# Patient Record
Sex: MALE | Race: BLACK OR AFRICAN AMERICAN | NOT HISPANIC OR LATINO
[De-identification: names, ages, dates, MRNs, and addresses within clinical notes are randomized per-mention and may not be internally consistent; named-entity substitution may affect disease eponyms.]

---

## 2018-03-16 PROBLEM — Z00.00 ENCOUNTER FOR PREVENTIVE HEALTH EXAMINATION: Status: ACTIVE | Noted: 2018-03-16

## 2018-03-19 ENCOUNTER — APPOINTMENT (OUTPATIENT)
Dept: CARDIOLOGY | Facility: CLINIC | Age: 63
End: 2018-03-19
Payer: COMMERCIAL

## 2018-03-19 DIAGNOSIS — Z13.6 ENCOUNTER FOR SCREENING FOR CARDIOVASCULAR DISORDERS: ICD-10-CM

## 2018-03-19 DIAGNOSIS — E11.9 TYPE 2 DIABETES MELLITUS W/OUT COMPLICATIONS: ICD-10-CM

## 2018-03-19 DIAGNOSIS — N18.9 CHRONIC KIDNEY DISEASE, UNSPECIFIED: ICD-10-CM

## 2018-03-19 DIAGNOSIS — Z83.3 FAMILY HISTORY OF DIABETES MELLITUS: ICD-10-CM

## 2018-03-19 DIAGNOSIS — E66.3 OVERWEIGHT: ICD-10-CM

## 2018-03-19 DIAGNOSIS — E78.00 PURE HYPERCHOLESTEROLEMIA, UNSPECIFIED: ICD-10-CM

## 2018-03-19 DIAGNOSIS — I10 ESSENTIAL (PRIMARY) HYPERTENSION: ICD-10-CM

## 2018-03-19 DIAGNOSIS — N52.9 MALE ERECTILE DYSFUNCTION, UNSPECIFIED: ICD-10-CM

## 2018-03-19 PROCEDURE — 99205 OFFICE O/P NEW HI 60 MIN: CPT

## 2018-03-20 VITALS
BODY MASS INDEX: 30.32 KG/M2 | HEART RATE: 86 BPM | DIASTOLIC BLOOD PRESSURE: 90 MMHG | WEIGHT: 182 LBS | HEIGHT: 65 IN | SYSTOLIC BLOOD PRESSURE: 150 MMHG

## 2018-03-21 PROBLEM — E78.00 ELEVATED CHOLESTEROL: Status: ACTIVE | Noted: 2018-03-21

## 2018-03-21 PROBLEM — Z83.3 FAMILY HISTORY OF DIABETES MELLITUS: Status: ACTIVE | Noted: 2018-03-21

## 2018-03-21 PROBLEM — N18.9 CHRONIC RENAL INSUFFICIENCY: Status: ACTIVE | Noted: 2018-03-21

## 2018-03-21 PROBLEM — E11.9 DIABETES: Status: ACTIVE | Noted: 2018-03-21

## 2018-03-21 PROBLEM — Z13.6 ENCOUNTER FOR SCREENING FOR CARDIOVASCULAR DISORDERS: Status: ACTIVE | Noted: 2018-03-21

## 2018-03-21 PROBLEM — I10 HYPERTENSION: Status: ACTIVE | Noted: 2018-03-21

## 2018-03-21 PROBLEM — E66.3 OVERWEIGHT (BMI 25.0-29.9): Status: ACTIVE | Noted: 2018-03-21

## 2018-03-21 RX ORDER — GLIMEPIRIDE 1 MG/1
1 TABLET ORAL
Refills: 0 | Status: ACTIVE | COMMUNITY

## 2018-03-21 RX ORDER — METFORMIN HYDROCHLORIDE 500 MG/1
500 TABLET, COATED ORAL
Refills: 0 | Status: ACTIVE | COMMUNITY

## 2018-03-21 RX ORDER — TADALAFIL 10 MG/1
10 TABLET, FILM COATED ORAL
Refills: 0 | Status: ACTIVE | COMMUNITY

## 2018-03-21 RX ORDER — LOSARTAN POTASSIUM AND HYDROCHLOROTHIAZIDE 25; 100 MG/1; MG/1
100-25 TABLET ORAL
Qty: 30 | Refills: 2 | Status: ACTIVE | COMMUNITY
Start: 1900-01-01 | End: 1900-01-01

## 2019-06-03 PROBLEM — N52.9 MALE ERECTILE DISORDER: Status: ACTIVE | Noted: 2018-03-21

## 2021-07-24 ENCOUNTER — TRANSCRIPTION ENCOUNTER (OUTPATIENT)
Age: 66
End: 2021-07-24

## 2022-01-18 ENCOUNTER — INPATIENT (INPATIENT)
Facility: HOSPITAL | Age: 67
LOS: 2 days | Discharge: HOME | End: 2022-01-21
Attending: INTERNAL MEDICINE | Admitting: INTERNAL MEDICINE
Payer: COMMERCIAL

## 2022-01-18 VITALS
TEMPERATURE: 98 F | RESPIRATION RATE: 19 BRPM | OXYGEN SATURATION: 97 % | HEIGHT: 65 IN | SYSTOLIC BLOOD PRESSURE: 154 MMHG | WEIGHT: 130.07 LBS | HEART RATE: 109 BPM | DIASTOLIC BLOOD PRESSURE: 96 MMHG

## 2022-01-18 LAB
ALBUMIN SERPL ELPH-MCNC: 2.8 G/DL — LOW (ref 3.3–5)
ALP SERPL-CCNC: 72 U/L — SIGNIFICANT CHANGE UP (ref 40–120)
ALT FLD-CCNC: 22 U/L — SIGNIFICANT CHANGE UP (ref 12–78)
ANION GAP SERPL CALC-SCNC: 5 MMOL/L — SIGNIFICANT CHANGE UP (ref 5–17)
AST SERPL-CCNC: 24 U/L — SIGNIFICANT CHANGE UP (ref 15–37)
BASOPHILS # BLD AUTO: 0.04 K/UL — SIGNIFICANT CHANGE UP (ref 0–0.2)
BASOPHILS NFR BLD AUTO: 0.6 % — SIGNIFICANT CHANGE UP (ref 0–2)
BILIRUB SERPL-MCNC: 0.4 MG/DL — SIGNIFICANT CHANGE UP (ref 0.2–1.2)
BUN SERPL-MCNC: 37 MG/DL — HIGH (ref 7–23)
CALCIUM SERPL-MCNC: 14.4 MG/DL — CRITICAL HIGH (ref 8.5–10.1)
CHLORIDE SERPL-SCNC: 100 MMOL/L — SIGNIFICANT CHANGE UP (ref 96–108)
CO2 SERPL-SCNC: 31 MMOL/L — SIGNIFICANT CHANGE UP (ref 22–31)
CREAT SERPL-MCNC: 3.25 MG/DL — HIGH (ref 0.5–1.3)
EOSINOPHIL # BLD AUTO: 0.17 K/UL — SIGNIFICANT CHANGE UP (ref 0–0.5)
EOSINOPHIL NFR BLD AUTO: 2.6 % — SIGNIFICANT CHANGE UP (ref 0–6)
GLUCOSE SERPL-MCNC: 76 MG/DL — SIGNIFICANT CHANGE UP (ref 70–99)
HCT VFR BLD CALC: 38.9 % — LOW (ref 39–50)
HGB BLD-MCNC: 12.5 G/DL — LOW (ref 13–17)
IMM GRANULOCYTES NFR BLD AUTO: 0.2 % — SIGNIFICANT CHANGE UP (ref 0–1.5)
LYMPHOCYTES # BLD AUTO: 1.72 K/UL — SIGNIFICANT CHANGE UP (ref 1–3.3)
LYMPHOCYTES # BLD AUTO: 26.1 % — SIGNIFICANT CHANGE UP (ref 13–44)
MAGNESIUM SERPL-MCNC: 2.5 MG/DL — SIGNIFICANT CHANGE UP (ref 1.6–2.6)
MCHC RBC-ENTMCNC: 25.9 PG — LOW (ref 27–34)
MCHC RBC-ENTMCNC: 32.1 GM/DL — SIGNIFICANT CHANGE UP (ref 32–36)
MCV RBC AUTO: 80.5 FL — SIGNIFICANT CHANGE UP (ref 80–100)
MONOCYTES # BLD AUTO: 1.04 K/UL — HIGH (ref 0–0.9)
MONOCYTES NFR BLD AUTO: 15.8 % — HIGH (ref 2–14)
NEUTROPHILS # BLD AUTO: 3.61 K/UL — SIGNIFICANT CHANGE UP (ref 1.8–7.4)
NEUTROPHILS NFR BLD AUTO: 54.7 % — SIGNIFICANT CHANGE UP (ref 43–77)
NRBC # BLD: 0 /100 WBCS — SIGNIFICANT CHANGE UP (ref 0–0)
PLATELET # BLD AUTO: 331 K/UL — SIGNIFICANT CHANGE UP (ref 150–400)
POTASSIUM SERPL-MCNC: 4.7 MMOL/L — SIGNIFICANT CHANGE UP (ref 3.5–5.3)
POTASSIUM SERPL-SCNC: 4.7 MMOL/L — SIGNIFICANT CHANGE UP (ref 3.5–5.3)
PROT SERPL-MCNC: 8.3 GM/DL — SIGNIFICANT CHANGE UP (ref 6–8.3)
RBC # BLD: 4.83 M/UL — SIGNIFICANT CHANGE UP (ref 4.2–5.8)
RBC # FLD: 13.8 % — SIGNIFICANT CHANGE UP (ref 10.3–14.5)
SODIUM SERPL-SCNC: 136 MMOL/L — SIGNIFICANT CHANGE UP (ref 135–145)
WBC # BLD: 6.59 K/UL — SIGNIFICANT CHANGE UP (ref 3.8–10.5)
WBC # FLD AUTO: 6.59 K/UL — SIGNIFICANT CHANGE UP (ref 3.8–10.5)

## 2022-01-18 PROCEDURE — 99222 1ST HOSP IP/OBS MODERATE 55: CPT

## 2022-01-18 PROCEDURE — 71045 X-RAY EXAM CHEST 1 VIEW: CPT | Mod: 26

## 2022-01-18 PROCEDURE — 93010 ELECTROCARDIOGRAM REPORT: CPT

## 2022-01-18 PROCEDURE — 99285 EMERGENCY DEPT VISIT HI MDM: CPT

## 2022-01-18 RX ORDER — SODIUM CHLORIDE 9 MG/ML
2000 INJECTION INTRAMUSCULAR; INTRAVENOUS; SUBCUTANEOUS ONCE
Refills: 0 | Status: COMPLETED | OUTPATIENT
Start: 2022-01-18 | End: 2022-01-18

## 2022-01-18 RX ADMIN — SODIUM CHLORIDE 2000 MILLILITER(S): 9 INJECTION INTRAMUSCULAR; INTRAVENOUS; SUBCUTANEOUS at 21:35

## 2022-01-18 NOTE — ED ADULT TRIAGE NOTE - CHIEF COMPLAINT QUOTE
[id number], Bilat alternating flank pain past X1 week  Went to pulmonologist/nephrologist sent for abnormal labs  Hypercalcemia

## 2022-01-18 NOTE — ED ADULT NURSE NOTE - OBJECTIVE STATEMENT
[id number], Bilat alternating flank pain past X1 week /lower back. Went to pulmonologist/nephrologist sent for abnormal labs  Hypercalcemia. HX of diabetes HLD. pain on urination no hematuria. as per pt ongoing weight loss x 6 months and chronic cough under care of pulmonologist

## 2022-01-18 NOTE — ED ADULT NURSE NOTE - CHIEF COMPLAINT QUOTE
[id number], Bilat alternating flank pain past X1 week  Went to pulmonologist/nephrologist sent for abnormal labs  Hypercalcemia

## 2022-01-18 NOTE — ED PROVIDER NOTE - OBJECTIVE STATEMENT
This patient is a 66 year old man hx of DM sent to the ER by his pulmonologist Dr. Cantrell for hypercalcemia.  Patient reports that he began seeing a pulmonologist in July 2020 for a chronic cough that he has been experiencing for over a year.  That pulmonologist ordered Xrays no further testing and no follow-up.  He reports unintentional weight loss.  No fever, sick contacts, recent travel, SOB, or chest pain.  He went to Dr. Cantrell for the first time yesterday and was told that the Xray showed "hazziness."  He had blood work drawn today and was told to he had elevated calcium and needed to be admitted.    Dr. Cantrell called the ER stating that patient's Xray findings were concerning for sarcoidosis with bilateral mediastinal adenopathy.

## 2022-01-19 DIAGNOSIS — D86.9 SARCOIDOSIS, UNSPECIFIED: ICD-10-CM

## 2022-01-19 DIAGNOSIS — Z29.9 ENCOUNTER FOR PROPHYLACTIC MEASURES, UNSPECIFIED: ICD-10-CM

## 2022-01-19 DIAGNOSIS — E83.52 HYPERCALCEMIA: ICD-10-CM

## 2022-01-19 LAB
A1C WITH ESTIMATED AVERAGE GLUCOSE RESULT: 6.1 % — HIGH (ref 4–5.6)
ALBUMIN SERPL ELPH-MCNC: 2.9 G/DL — LOW (ref 3.3–5)
ALP SERPL-CCNC: 69 U/L — SIGNIFICANT CHANGE UP (ref 40–120)
ALT FLD-CCNC: 20 U/L — SIGNIFICANT CHANGE UP (ref 12–78)
ANION GAP SERPL CALC-SCNC: 2 MMOL/L — LOW (ref 5–17)
ANION GAP SERPL CALC-SCNC: 2 MMOL/L — LOW (ref 5–17)
APPEARANCE UR: CLEAR — SIGNIFICANT CHANGE UP
AST SERPL-CCNC: 21 U/L — SIGNIFICANT CHANGE UP (ref 15–37)
BACTERIA # UR AUTO: ABNORMAL
BILIRUB SERPL-MCNC: 0.3 MG/DL — SIGNIFICANT CHANGE UP (ref 0.2–1.2)
BILIRUB UR-MCNC: NEGATIVE — SIGNIFICANT CHANGE UP
BUN SERPL-MCNC: 35 MG/DL — HIGH (ref 7–23)
BUN SERPL-MCNC: 35 MG/DL — HIGH (ref 7–23)
CA-I BLD-SCNC: 2.05 MMOL/L — CRITICAL HIGH (ref 1.15–1.33)
CALCIUM SERPL-MCNC: 14.8 MG/DL — CRITICAL HIGH (ref 8.5–10.1)
CALCIUM SERPL-MCNC: 14.9 MG/DL — CRITICAL HIGH (ref 8.5–10.1)
CHLORIDE SERPL-SCNC: 103 MMOL/L — SIGNIFICANT CHANGE UP (ref 96–108)
CHLORIDE SERPL-SCNC: 108 MMOL/L — SIGNIFICANT CHANGE UP (ref 96–108)
CO2 SERPL-SCNC: 31 MMOL/L — SIGNIFICANT CHANGE UP (ref 22–31)
CO2 SERPL-SCNC: 33 MMOL/L — HIGH (ref 22–31)
COD CRY URNS QL: ABNORMAL
COLOR SPEC: YELLOW — SIGNIFICANT CHANGE UP
CREAT SERPL-MCNC: 2.88 MG/DL — HIGH (ref 0.5–1.3)
CREAT SERPL-MCNC: 2.9 MG/DL — HIGH (ref 0.5–1.3)
DIFF PNL FLD: ABNORMAL
EPI CELLS # UR: SIGNIFICANT CHANGE UP
ESTIMATED AVERAGE GLUCOSE: 128 MG/DL — HIGH (ref 68–114)
FLUAV AG NPH QL: SIGNIFICANT CHANGE UP
FLUBV AG NPH QL: SIGNIFICANT CHANGE UP
GLUCOSE BLDC GLUCOMTR-MCNC: 112 MG/DL — HIGH (ref 70–99)
GLUCOSE BLDC GLUCOMTR-MCNC: 154 MG/DL — HIGH (ref 70–99)
GLUCOSE BLDC GLUCOMTR-MCNC: 44 MG/DL — CRITICAL LOW (ref 70–99)
GLUCOSE BLDC GLUCOMTR-MCNC: 47 MG/DL — CRITICAL LOW (ref 70–99)
GLUCOSE BLDC GLUCOMTR-MCNC: 60 MG/DL — LOW (ref 70–99)
GLUCOSE BLDC GLUCOMTR-MCNC: 74 MG/DL — SIGNIFICANT CHANGE UP (ref 70–99)
GLUCOSE BLDC GLUCOMTR-MCNC: 95 MG/DL — SIGNIFICANT CHANGE UP (ref 70–99)
GLUCOSE BLDC GLUCOMTR-MCNC: 97 MG/DL — SIGNIFICANT CHANGE UP (ref 70–99)
GLUCOSE SERPL-MCNC: 119 MG/DL — HIGH (ref 70–99)
GLUCOSE SERPL-MCNC: 68 MG/DL — LOW (ref 70–99)
GLUCOSE UR QL: NEGATIVE MG/DL — SIGNIFICANT CHANGE UP
HCV AB S/CO SERPL IA: 0.24 S/CO — SIGNIFICANT CHANGE UP (ref 0–0.99)
HCV AB SERPL-IMP: SIGNIFICANT CHANGE UP
KETONES UR-MCNC: NEGATIVE — SIGNIFICANT CHANGE UP
LEUKOCYTE ESTERASE UR-ACNC: ABNORMAL
NITRITE UR-MCNC: NEGATIVE — SIGNIFICANT CHANGE UP
PH UR: 6.5 — SIGNIFICANT CHANGE UP (ref 5–8)
POTASSIUM SERPL-MCNC: 3.7 MMOL/L — SIGNIFICANT CHANGE UP (ref 3.5–5.3)
POTASSIUM SERPL-MCNC: 4.1 MMOL/L — SIGNIFICANT CHANGE UP (ref 3.5–5.3)
POTASSIUM SERPL-SCNC: 3.7 MMOL/L — SIGNIFICANT CHANGE UP (ref 3.5–5.3)
POTASSIUM SERPL-SCNC: 4.1 MMOL/L — SIGNIFICANT CHANGE UP (ref 3.5–5.3)
PROT SERPL-MCNC: 7.8 GM/DL — SIGNIFICANT CHANGE UP (ref 6–8.3)
PROT UR-MCNC: NEGATIVE MG/DL — SIGNIFICANT CHANGE UP
RBC CASTS # UR COMP ASSIST: SIGNIFICANT CHANGE UP /HPF (ref 0–4)
SARS-COV-2 RNA SPEC QL NAA+PROBE: SIGNIFICANT CHANGE UP
SODIUM SERPL-SCNC: 138 MMOL/L — SIGNIFICANT CHANGE UP (ref 135–145)
SODIUM SERPL-SCNC: 141 MMOL/L — SIGNIFICANT CHANGE UP (ref 135–145)
SP GR SPEC: 1.01 — SIGNIFICANT CHANGE UP (ref 1.01–1.02)
UROBILINOGEN FLD QL: NEGATIVE MG/DL — SIGNIFICANT CHANGE UP
WBC UR QL: ABNORMAL

## 2022-01-19 PROCEDURE — 99232 SBSQ HOSP IP/OBS MODERATE 35: CPT

## 2022-01-19 PROCEDURE — 74018 RADEX ABDOMEN 1 VIEW: CPT | Mod: 26

## 2022-01-19 PROCEDURE — 99255 IP/OBS CONSLTJ NEW/EST HI 80: CPT

## 2022-01-19 PROCEDURE — 99223 1ST HOSP IP/OBS HIGH 75: CPT

## 2022-01-19 RX ORDER — DEXTROSE 50 % IN WATER 50 %
25 SYRINGE (ML) INTRAVENOUS ONCE
Refills: 0 | Status: COMPLETED | OUTPATIENT
Start: 2022-01-19 | End: 2022-01-19

## 2022-01-19 RX ORDER — DEXTROSE 50 % IN WATER 50 %
25 SYRINGE (ML) INTRAVENOUS ONCE
Refills: 0 | Status: DISCONTINUED | OUTPATIENT
Start: 2022-01-19 | End: 2022-01-21

## 2022-01-19 RX ORDER — DEXTROSE 50 % IN WATER 50 %
12.5 SYRINGE (ML) INTRAVENOUS ONCE
Refills: 0 | Status: DISCONTINUED | OUTPATIENT
Start: 2022-01-19 | End: 2022-01-21

## 2022-01-19 RX ORDER — ZOLEDRONIC ACID 5 MG/100ML
4 INJECTION, SOLUTION INTRAVENOUS ONCE
Refills: 0 | Status: DISCONTINUED | OUTPATIENT
Start: 2022-01-19 | End: 2022-01-19

## 2022-01-19 RX ORDER — FUROSEMIDE 40 MG
40 TABLET ORAL EVERY 12 HOURS
Refills: 0 | Status: COMPLETED | OUTPATIENT
Start: 2022-01-19 | End: 2022-01-20

## 2022-01-19 RX ORDER — SODIUM CHLORIDE 9 MG/ML
1000 INJECTION, SOLUTION INTRAVENOUS
Refills: 0 | Status: COMPLETED | OUTPATIENT
Start: 2022-01-19 | End: 2022-01-19

## 2022-01-19 RX ORDER — AMLODIPINE BESYLATE 2.5 MG/1
10 TABLET ORAL DAILY
Refills: 0 | Status: DISCONTINUED | OUTPATIENT
Start: 2022-01-19 | End: 2022-01-21

## 2022-01-19 RX ORDER — DEXTROSE 50 % IN WATER 50 %
15 SYRINGE (ML) INTRAVENOUS ONCE
Refills: 0 | Status: COMPLETED | OUTPATIENT
Start: 2022-01-19 | End: 2022-01-19

## 2022-01-19 RX ORDER — SENNA PLUS 8.6 MG/1
2 TABLET ORAL AT BEDTIME
Refills: 0 | Status: DISCONTINUED | OUTPATIENT
Start: 2022-01-19 | End: 2022-01-21

## 2022-01-19 RX ORDER — SODIUM CHLORIDE 9 MG/ML
1000 INJECTION, SOLUTION INTRAVENOUS ONCE
Refills: 0 | Status: COMPLETED | OUTPATIENT
Start: 2022-01-19 | End: 2022-01-19

## 2022-01-19 RX ORDER — SODIUM CHLORIDE 9 MG/ML
1000 INJECTION INTRAMUSCULAR; INTRAVENOUS; SUBCUTANEOUS
Refills: 0 | Status: DISCONTINUED | OUTPATIENT
Start: 2022-01-19 | End: 2022-01-19

## 2022-01-19 RX ORDER — PAMIDRONATE DISODIUM 9 MG/ML
90 INJECTION, SOLUTION INTRAVENOUS ONCE
Refills: 0 | Status: COMPLETED | OUTPATIENT
Start: 2022-01-19 | End: 2022-01-19

## 2022-01-19 RX ORDER — SODIUM CHLORIDE 9 MG/ML
1000 INJECTION, SOLUTION INTRAVENOUS
Refills: 0 | Status: DISCONTINUED | OUTPATIENT
Start: 2022-01-19 | End: 2022-01-21

## 2022-01-19 RX ORDER — INSULIN LISPRO 100/ML
VIAL (ML) SUBCUTANEOUS
Refills: 0 | Status: DISCONTINUED | OUTPATIENT
Start: 2022-01-19 | End: 2022-01-21

## 2022-01-19 RX ORDER — GLUCAGON INJECTION, SOLUTION 0.5 MG/.1ML
1 INJECTION, SOLUTION SUBCUTANEOUS ONCE
Refills: 0 | Status: DISCONTINUED | OUTPATIENT
Start: 2022-01-19 | End: 2022-01-21

## 2022-01-19 RX ORDER — HYDRALAZINE HCL 50 MG
10 TABLET ORAL ONCE
Refills: 0 | Status: COMPLETED | OUTPATIENT
Start: 2022-01-19 | End: 2022-01-19

## 2022-01-19 RX ORDER — CALCITONIN SALMON 200 [IU]/ML
260 INJECTION, SOLUTION INTRAMUSCULAR ONCE
Refills: 0 | Status: DISCONTINUED | OUTPATIENT
Start: 2022-01-19 | End: 2022-01-19

## 2022-01-19 RX ORDER — POLYETHYLENE GLYCOL 3350 17 G/17G
17 POWDER, FOR SOLUTION ORAL DAILY
Refills: 0 | Status: DISCONTINUED | OUTPATIENT
Start: 2022-01-19 | End: 2022-01-21

## 2022-01-19 RX ORDER — SODIUM CHLORIDE 9 MG/ML
1000 INJECTION INTRAMUSCULAR; INTRAVENOUS; SUBCUTANEOUS
Refills: 0 | Status: DISCONTINUED | OUTPATIENT
Start: 2022-01-19 | End: 2022-01-21

## 2022-01-19 RX ORDER — DEXTROSE 50 % IN WATER 50 %
15 SYRINGE (ML) INTRAVENOUS ONCE
Refills: 0 | Status: DISCONTINUED | OUTPATIENT
Start: 2022-01-19 | End: 2022-01-21

## 2022-01-19 RX ORDER — CALCITONIN SALMON 200 [IU]/ML
260 INJECTION, SOLUTION INTRAMUSCULAR ONCE
Refills: 0 | Status: COMPLETED | OUTPATIENT
Start: 2022-01-19 | End: 2022-01-19

## 2022-01-19 RX ADMIN — Medication 15 GRAM(S): at 16:51

## 2022-01-19 RX ADMIN — AMLODIPINE BESYLATE 10 MILLIGRAM(S): 2.5 TABLET ORAL at 14:43

## 2022-01-19 RX ADMIN — Medication 40 MILLIGRAM(S): at 14:43

## 2022-01-19 RX ADMIN — SODIUM CHLORIDE 150 MILLILITER(S): 9 INJECTION, SOLUTION INTRAVENOUS at 02:47

## 2022-01-19 RX ADMIN — Medication 10 MILLIGRAM(S): at 05:40

## 2022-01-19 RX ADMIN — SODIUM CHLORIDE 150 MILLILITER(S): 9 INJECTION INTRAMUSCULAR; INTRAVENOUS; SUBCUTANEOUS at 14:46

## 2022-01-19 RX ADMIN — CALCITONIN SALMON 260 INTERNATIONAL UNIT(S): 200 INJECTION, SOLUTION INTRAMUSCULAR at 16:51

## 2022-01-19 RX ADMIN — SODIUM CHLORIDE 1000 MILLILITER(S): 9 INJECTION, SOLUTION INTRAVENOUS at 01:46

## 2022-01-19 RX ADMIN — PAMIDRONATE DISODIUM 70 MILLIGRAM(S): 9 INJECTION, SOLUTION INTRAVENOUS at 23:02

## 2022-01-19 RX ADMIN — Medication 25 GRAM(S): at 18:58

## 2022-01-19 RX ADMIN — Medication 40 MILLIGRAM(S): at 21:08

## 2022-01-19 RX ADMIN — SODIUM CHLORIDE 125 MILLILITER(S): 9 INJECTION INTRAMUSCULAR; INTRAVENOUS; SUBCUTANEOUS at 12:38

## 2022-01-19 RX ADMIN — SENNA PLUS 2 TABLET(S): 8.6 TABLET ORAL at 22:00

## 2022-01-19 RX ADMIN — POLYETHYLENE GLYCOL 3350 17 GRAM(S): 17 POWDER, FOR SOLUTION ORAL at 14:43

## 2022-01-19 NOTE — PROGRESS NOTE ADULT - PROBLEM SELECTOR PLAN 1
Outpatient CT shows (patient has copy of full report on phone)  Mediastinal and bilateral hilar adenopathy  Diffuse tiny nodules wihting a perilymphmatic distribution.  diffuse lower lobe ground glass opacity.      Consider sarcoidosis, especially in the setting of hypercalcemia.     Dr Hallman consulted  will reach out to Dr. Caraballo

## 2022-01-19 NOTE — H&P ADULT - PROBLEM SELECTOR PLAN 1
Outpatient CT shows (patient has copy of full report on phone)  Mediastinal and bilateral hilar adenopathy  Diffuse tiny nodules wihting a perilymphmatic distribution.  diffuse lower lobe ground glass opacity.      Consider sarcoidosis, especially in the setting of hypercalcemia.  Will consult Dr Hallman

## 2022-01-19 NOTE — CONSULT NOTE ADULT - ASSESSMENT
66 year old male with progressively worsening cough/dyspnea x 1 year, found to have hilar/mediastinal lymphadenopathy on recent CT, and now admitted for hypercalcemia.  Presentation most suggestive of sarcoidosis.      - Will start prednisone 40mg daily.    - Pulm planning EBUS / lung bx.    - f/u vit D levels.
67 y/o M w/HTN, DM, and chronic cough sent in for hypercalcemia. Patient has been undergoing workup for chronic cough and found to have bilateral opacities and hilar adenopathy on CT scan concerning for sarcoidosis now sent in for hypercalcemia likely secondary to sarcoidosis. Severe hypercalcemia on labs, however relatively asymtpomatic. MAURI.     - Treatment of hypercalcemia with IV fluids, diuretics, calcitonin, and bisphosphonate once renal function improves.  - Steroids for likely sarcoidosis  - Agree with EBUS for lymph node biopsies once calcium levels improved  - Trend Cr, avoid nephrotoxins  - Telemetry monitoring  - No current need for ICU level of care. Please recall with questions/concerns

## 2022-01-19 NOTE — CHART NOTE - NSCHARTNOTEFT_GEN_A_CORE
EVENT: Received telephone call from RN that pt is refusing to be transferred to telemetry unit.    HPI: 67 y/o M w/HTN, DM, and chronic cough sent in for hypercalcemia. Patient has been undergoing workup for chronic cough and found to have bilateral opacities and hilar adenopathy on CT scan concerning for sarcoidosis now sent in for hypercalcemia likely secondary to sarcoidosis. Severe hypercalcemia on labs, however relatively asymptomatic. MAURI.   Recommendations as per Md Barreto  - Treatment of hypercalcemia with IV fluids, diuretics, calcitonin, and bisphosphonate once renal function improves.  - Steroids for likely sarcoidosis  - Agree with EBUS for lymph node biopsies once calcium levels improved  - Trend Cr, avoid nephrotoxins  - Telemetry monitoring    Pt is refusing to be transferred to tele unit. Spoke to pt & his wife at length but both of them refused to be transferred due to the coziness of that room.

## 2022-01-19 NOTE — H&P ADULT - NSHPREVIEWOFSYSTEMS_GEN_ALL_CORE
Constitutional: no fever, chills, night sweats  Ears: no hearing changes or ear pain,   Nose: no nasal congestion, sinus pain, or rhinorrhea  Cardio: no chest pain, orthopnea, edema, or palpitations  Resp: no dyspnea, cough, wheezing  GI: no nausea, vomiting, diarrhea, constipation, hematochezia, or melena  : dysuria positive.  No urinary frequency, hematuria  MSK: no back pain, neck pain  Skin: no rash, pruritis   Neuro: no weakness, dizziness, lightheadedness, syncope   Heme/Lymph: no bruising or bleeding

## 2022-01-19 NOTE — PROVIDER CONTACT NOTE (OTHER) - SITUATION
pt's fs 47 , protocol followed NP made aware. calcium ionized 2.05, and glucose level, 74 bisi made aware

## 2022-01-19 NOTE — CONSULT NOTE ADULT - SUBJECTIVE AND OBJECTIVE BOX
[facility] NEPHROLOGY [facility], [facility]  NEPHROLOGY AND HYPERTENSION  [address]  [address]  [place] [address]  [phone]    MD TRACEY REARDON MD ANDREY GONCHARUK, MD MADHU KORRAPATI, MD YELENA ROSENBERG, MD BINNY KOSHY, MD CHRISTOPHER CAPUTO, MD EDWARD BOVER, MD      Information from chart:  "Patient is a 66y old  Male who presents with a chief complaint of sarcoidosis, hypercalcemia (2022 20:34)    HPI:  Patient is a 66M with a PMH of DM who presents to the ED for abnormal labs.  Patient has been following with Pulmonologist Michael Cantrell for history of cough x 1 year and worsening dyspnea.  Recently had a CT performed that showed hilar and mediastinal adenopathy and ground glass opacities.  Concern for Sarcoidosis, patient recently did labs and referred to the ED for hypercalcemia.  Patient states that he occasionally has flank pain on either side, along with dysuria.  Patient has no other complaints.  Nonsmoker.  Vitals stable, labs show significant hypercalcemia.  Will admit to med surg.   (2022 00:31)   "      No distress    PAST MEDICAL & SURGICAL HISTORY:  Diabetes    HLD (hyperlipidemia)      FAMILY HISTORY:  FH: HTN (hypertension)      Allergies    No Known Allergies    Intolerances      Home Medications:    MEDICATIONS  (STANDING):  amLODIPine   Tablet 10 milliGRAM(s) Oral daily  dextrose 40% Gel 15 Gram(s) Oral once  dextrose 5%. 1000 milliLiter(s) (50 mL/Hr) IV Continuous <Continuous>  dextrose 5%. 1000 milliLiter(s) (100 mL/Hr) IV Continuous <Continuous>  dextrose 50% Injectable 25 Gram(s) IV Push once  dextrose 50% Injectable 12.5 Gram(s) IV Push once  dextrose 50% Injectable 25 Gram(s) IV Push once  furosemide   Injectable 40 milliGRAM(s) IV Push every 12 hours  glucagon  Injectable 1 milliGRAM(s) IntraMuscular once  insulin lispro (ADMELOG) corrective regimen sliding scale   SubCutaneous three times a day before meals  pamidronate IVPB 90 milliGRAM(s) IV Intermittent once  polyethylene glycol 3350 17 Gram(s) Oral daily  predniSONE   Tablet 40 milliGRAM(s) Oral daily  senna 2 Tablet(s) Oral at bedtime  sodium chloride 0.9%. 1000 milliLiter(s) (150 mL/Hr) IV Continuous <Continuous>    MEDICATIONS  (PRN):    Vital Signs Last 24 Hrs  T(C): 36.7 (2022 11:30), Max: 37.2 (2022 02:06)  T(F): 98.1 (2022 11:30), Max: 98.9 (2022 02:06)  HR: 93 (2022 11:30) (93 - 102)  BP: 161/91 (2022 11:30) (160/98 - 182/95)  BP(mean): --  RR: 18 (2022 11:30) (17 - 20)  SpO2: 98% (2022 11:30) (95% - 98%)    Daily Height in cm: 165.1 (2022 02:06)    Daily     CAPILLARY BLOOD GLUCOSE      POCT Blood Glucose.: 154 mg/dL (2022 19:38)  POCT Blood Glucose.: 60 mg/dL (2022 18:50)  POCT Blood Glucose.: 74 mg/dL (2022 17:32)  POCT Blood Glucose.: 44 mg/dL (2022 16:38)  POCT Blood Glucose.: 47 mg/dL (2022 16:35)  POCT Blood Glucose.: 95 mg/dL (2022 12:37)  POCT Blood Glucose.: 112 mg/dL (2022 09:53)    PHYSICAL EXAM:      T(C): 36.7 (22 @ 11:30), Max: 37.2 (22 @ 02:06)  HR: 93 (22 @ 11:30) (93 - 102)  BP: 161/91 (22 @ 11:30) (160/98 - 182/95)  RR: 18 (22 @ 11:30) (17 - 20)  SpO2: 98% (22 @ 11:30) (95% - 98%)  Wt(kg): --  Lungs clear  Heart S1S2  Abd soft NT ND  Extremities:   tr edema                  138  |  103  |  35<H>  ----------------------------<  68<L>  3.7   |  33<H>  |  2.90<H>    Ca    14.8<HH>      2022 19:08  Mg     2.5         TPro  7.8  /  Alb  2.9<L>  /  TBili  0.3  /  DBili  x   /  AST  21  /  ALT  20  /  AlkPhos  69                            12.5   6.59  )-----------( 331      ( 2022 20:17 )             38.9     Creatinine Trend: 2.90<--, 2.88<--, 3.25<--  Urinalysis Basic - ( 2022 01:51 )    Color: Yellow / Appearance: Clear / S.010 / pH: x  Gluc: x / Ketone: Negative  / Bili: Negative / Urobili: Negative mg/dL   Blood: x / Protein: Negative mg/dL / Nitrite: Negative   Leuk Esterase: Trace / RBC: 0-2 /HPF / WBC 6-10   Sq Epi: x / Non Sq Epi: Few / Bacteria: Few      Trend Bun/Cr  22 @ 19:08  BUN/CR -  35<H> / 2.90<H>  22 @ 11:28  BUN/CR -  35<H> / 2.88<H>  22 @ 20:17  BUN/CR -  37<H> / 3.25<H>        Assessment   MAURI; hypercalcemia related; r/o sarcoidosis AIN;   Degree of CKD suspected    Plan  Continue IVF  ml hr  Lasix 40 mg IV q 12  Calcitonin  Pamidonate 90 mg once  Prednisone 40 mg daily  PTH; Vit D 1,25; 25   Urine eos   Will follow     Carlos Leigh MD

## 2022-01-19 NOTE — CONSULT NOTE ADULT - SUBJECTIVE AND OBJECTIVE BOX
HISTORY OF PRESENT ILLNESS:    Patient is a 66y Male    HPI:  Patient is a 66M with a PMH of DM who presents to the ED for abnormal labs.  Patient has been following with Pulmonologist Michael Cantrell for history of cough x 1 year and worsening dyspnea.  Recently had a CT performed that showed hilar and mediastinal adenopathy and ground glass opacities.  Concern for Sarcoidosis, patient recently did labs and referred to the ED for hypercalcemia.  Patient states that he occasionally has flank pain on either side, along with dysuria.  Patient has no other complaints.  Nonsmoker.  Vitals stable, labs show significant hypercalcemia.  Will admit to med surg.   (2022 00:31)    PAST MEDICAL & SURGICAL HISTORY:  Diabetes    HLD (hyperlipidemia)        ROS: (+)dry mouth.  No fever/chills, wt loss, night sweats, oral ulcers, rashes, joint pain, alopecia, photosensitivity, dry eye, Raynaud's, dysphagia, focal weakness, or eye symptoms.    MEDICATIONS  (STANDING):  amLODIPine   Tablet 10 milliGRAM(s) Oral daily  dextrose 40% Gel 15 Gram(s) Oral once  dextrose 5%. 1000 milliLiter(s) (50 mL/Hr) IV Continuous <Continuous>  dextrose 5%. 1000 milliLiter(s) (100 mL/Hr) IV Continuous <Continuous>  dextrose 50% Injectable 25 Gram(s) IV Push once  dextrose 50% Injectable 12.5 Gram(s) IV Push once  dextrose 50% Injectable 25 Gram(s) IV Push once  furosemide   Injectable 40 milliGRAM(s) IV Push every 12 hours  glucagon  Injectable 1 milliGRAM(s) IntraMuscular once  insulin lispro (ADMELOG) corrective regimen sliding scale   SubCutaneous three times a day before meals  polyethylene glycol 3350 17 Gram(s) Oral daily  senna 2 Tablet(s) Oral at bedtime  sodium chloride 0.9%. 1000 milliLiter(s) (150 mL/Hr) IV Continuous <Continuous>    MEDICATIONS  (PRN):      Allergies    No Known Allergies    Intolerances        FAMILY HISTORY:  FH: HTN (hypertension)        SOCIAL HISTORY:  Tobacco--   none            Vital Signs Last 24 Hrs  T(C): 36.7 (2022 11:30), Max: 37.2 (2022 02:06)  T(F): 98.1 (2022 11:30), Max: 98.9 (2022 02:06)  HR: 93 (2022 11:30) (93 - 102)  BP: 161/91 (2022 11:30) (160/98 - 182/95)  BP(mean): --  RR: 18 (2022 11:30) (17 - 20)  SpO2: 98% (2022 11:30) (95% - 98%)    PHYSICAL EXAM:  General :  NAD  HEENT--  no oral ulcers  Lungs--  CTA B/L  Heart--  RRR, nlS1 &S2 normal;   Abdomen--  soft, NT, ND +BS  Skin:  no rashes  Musculoskeletal exam:  No synovitis;  normal ROM in all joints    LABS:                        12.5   6.59  )-----------( 331      ( 2022 20:17 )             38.9         138  |  103  |  35<H>  ----------------------------<  68<L>  3.7   |  33<H>  |  2.90<H>    Ca    14.8<HH>      2022 19:08  Mg     2.5         TPro  7.8  /  Alb  2.9<L>  /  TBili  0.3  /  DBili  x   /  AST  21  /  ALT  20  /  AlkPhos  69        Urinalysis Basic - ( 2022 01:51 )    Color: Yellow / Appearance: Clear / S.010 / pH: x  Gluc: x / Ketone: Negative  / Bili: Negative / Urobili: Negative mg/dL   Blood: x / Protein: Negative mg/dL / Nitrite: Negative   Leuk Esterase: Trace / RBC: 0-2 /HPF / WBC 6-10   Sq Epi: x / Non Sq Epi: Few / Bacteria: Few        RADIOLOGY & ADDITIONAL STUDIES:

## 2022-01-19 NOTE — CONSULT NOTE ADULT - SUBJECTIVE AND OBJECTIVE BOX
Patient is a 66y old  Male who presents with a chief complaint of sarcoidosis, hypercalcemia (2022 13:49)    HPI:  66M with DM , HTN, who presents to the ED for abnormal labs.  Patient has been following with Pulmonologist Michael Cantrell for cough x 1 year and worsening dyspnea.  Recently had a CT performed that showed hilar and mediastinal adenopathy and ground glass opacities suspicious for Sarcoidosis. Also found with severe  hypercalcemia, so was sent to ED for evaluation. Wife works in [employer].   Occasionally has flank pain on either side, along with dysuria.    Nonsmoker.      PAST MEDICAL & SURGICAL HISTORY:  Diabetes    HLD (hyperlipidemia)    FAMILY HISTORY:  FH: HTN (hypertension)    SOCIAL HISTORY: BMI (kg/m2): 24.1 (-19 @ 02:06). non smoker    Allergies  No Known Allergies    MEDICATIONS  (STANDING):  amLODIPine   Tablet 10 milliGRAM(s) Oral daily  dextrose 40% Gel 15 Gram(s) Oral once  dextrose 5%. 1000 milliLiter(s) (50 mL/Hr) IV Continuous <Continuous>  dextrose 5%. 1000 milliLiter(s) (100 mL/Hr) IV Continuous <Continuous>  dextrose 50% Injectable 25 Gram(s) IV Push once  dextrose 50% Injectable 12.5 Gram(s) IV Push once  dextrose 50% Injectable 25 Gram(s) IV Push once  furosemide   Injectable 40 milliGRAM(s) IV Push every 12 hours  glucagon  Injectable 1 milliGRAM(s) IntraMuscular once  insulin lispro (ADMELOG) corrective regimen sliding scale   SubCutaneous three times a day before meals  polyethylene glycol 3350 17 Gram(s) Oral daily  senna 2 Tablet(s) Oral at bedtime  sodium chloride 0.9%. 1000 milliLiter(s) (150 mL/Hr) IV Continuous <Continuous>    Vital Signs Last 24 Hrs  T(C): 36.7 (2022 11:30), Max: 37.2 (2022 02:06)  T(F): 98.1 (2022 11:30), Max: 98.9 (2022 02:06)  HR: 93 (2022 11:30) (93 - 102)  BP: 161/91 (2022 11:30) (160/98 - 182/95)  BP(mean): --  RR: 18 (2022 11:30) (17 - 20)  SpO2: 98% (2022 11:30) (95% - 98%)    PHYSICAL EXAM:  GEN:         Awake, responsive and comfortable.  HEENT:    Normal.    RESP:       no wheezing.  CVS:          Regular rate and rhythm.   ABD:         Soft, non-tender, non-distended;   SKIN:           Warm and dry.  EXTR:            No clubbing, cyanosis or edema  CNS:              Intact sensory and motor function.  PSYCH:        cooperative, no anxiety or depression    LABS:                        12.5   6.59  )-----------( 331      ( 2022 20:17 )             38.9         141  |  108  |  35<H>  ----------------------------<  119<H>  4.1   |  31  |  2.88<H>    Ca    14.9<HH>      2022 11:28  Mg     2.5         TPro  7.8  /  Alb  2.9<L>  /  TBili  0.3  /  DBili  x   /  AST  21  /  ALT  20  /  AlkPhos  69      Urinalysis Basic - ( 2022 01:51 )    Color: Yellow / Appearance: Clear / S.010 / pH: x  Gluc: x / Ketone: Negative  / Bili: Negative / Urobili: Negative mg/dL   Blood: x / Protein: Negative mg/dL / Nitrite: Negative   Leuk Esterase: Trace / RBC: 0-2 /HPF / WBC 6-10   Sq Epi: x / Non Sq Epi: Few / Bacteria: Few    EKG: sinus tachycardia.    RADIOLOGY & ADDITIONAL STUDIES:  < from: Xray Chest 1 View- PORTABLE-Routine (Xray Chest 1 View- PORTABLE-Routine .) (22 @ 21:05) >  ACC: [id number] EXAM:  XR CHEST PORTABLE ROUTINE 1V                          PROCEDURE DATE:  2022      INTERPRETATION:  Chest one view    HISTORY: Hypercalcemia    COMPARISON STUDY: 2021    Frontal expiratory view of the chest shows the heart to be normal in   size. The lungs show patchy infiltrates of the lower lungs and there is   no evidence of pneumothorax nor pleural effusion.    IMPRESSION:  Patchy infiltrates.    Thank you for the courtesy of this referral.    PAVEL BURT MD; Attending Interventional Radiologist  This document has been electronically signed. 2022  1:55PM    ASSESSMENT AND PLAN:  ·	SOB.  ·	Cough.  ·	Severe hypercalcemia  ·	Renal Insuffiencey.  ·	Dehydration  ·	DM.  ·	HTN    Continue IV hydration, follow calcium level.  Follow PTH and ACE level.  For Nephrology evaluation.  Will need EBUS guided biopsy of hilar lymph nodes.  Discussed with wife at bed side.    Patient is a 66y old  Male who presents with a chief complaint of sarcoidosis, hypercalcemia (2022 13:49)    HPI:  66M with DM , HTN, who presents to the ED for abnormal labs.  Patient has been following with Pulmonologist Michael Cantrell for cough x 1 year and worsening dyspnea.  Recently had a CT performed that showed hilar and mediastinal adenopathy and ground glass opacities suspicious for Sarcoidosis. Also found with severe  hypercalcemia, so was sent to ED for evaluation. Wife works in [employer].   Occasionally has flank pain on either side, along with dysuria.    Nonsmoker.      PAST MEDICAL & SURGICAL HISTORY:  Diabetes    HLD (hyperlipidemia)    FAMILY HISTORY:  FH: HTN (hypertension)    SOCIAL HISTORY: BMI (kg/m2): 24.1 (-19 @ 02:06). non smoker    Allergies  No Known Allergies    MEDICATIONS  (STANDING):  amLODIPine   Tablet 10 milliGRAM(s) Oral daily  dextrose 40% Gel 15 Gram(s) Oral once  dextrose 5%. 1000 milliLiter(s) (50 mL/Hr) IV Continuous <Continuous>  dextrose 5%. 1000 milliLiter(s) (100 mL/Hr) IV Continuous <Continuous>  dextrose 50% Injectable 25 Gram(s) IV Push once  dextrose 50% Injectable 12.5 Gram(s) IV Push once  dextrose 50% Injectable 25 Gram(s) IV Push once  furosemide   Injectable 40 milliGRAM(s) IV Push every 12 hours  glucagon  Injectable 1 milliGRAM(s) IntraMuscular once  insulin lispro (ADMELOG) corrective regimen sliding scale   SubCutaneous three times a day before meals  polyethylene glycol 3350 17 Gram(s) Oral daily  senna 2 Tablet(s) Oral at bedtime  sodium chloride 0.9%. 1000 milliLiter(s) (150 mL/Hr) IV Continuous <Continuous>    Vital Signs Last 24 Hrs  T(C): 36.7 (2022 11:30), Max: 37.2 (2022 02:06)  T(F): 98.1 (2022 11:30), Max: 98.9 (2022 02:06)  HR: 93 (2022 11:30) (93 - 102)  BP: 161/91 (2022 11:30) (160/98 - 182/95)  BP(mean): --  RR: 18 (2022 11:30) (17 - 20)  SpO2: 98% (2022 11:30) (95% - 98%)    PHYSICAL EXAM:  GEN:         Awake, responsive and comfortable.  HEENT:    Normal.    RESP:       no wheezing.  CVS:          Regular rate and rhythm.   ABD:         Soft, non-tender, non-distended;   SKIN:           Warm and dry.  EXTR:            No clubbing, cyanosis or edema  CNS:              Intact sensory and motor function.  PSYCH:        cooperative, no anxiety or depression    LABS:                        12.5   6.59  )-----------( 331      ( 2022 20:17 )             38.9         141  |  108  |  35<H>  ----------------------------<  119<H>  4.1   |  31  |  2.88<H>    Ca    14.9<HH>      2022 11:28  Mg     2.5         TPro  7.8  /  Alb  2.9<L>  /  TBili  0.3  /  DBili  x   /  AST  21  /  ALT  20  /  AlkPhos  69      Urinalysis Basic - ( 2022 01:51 )    Color: Yellow / Appearance: Clear / S.010 / pH: x  Gluc: x / Ketone: Negative  / Bili: Negative / Urobili: Negative mg/dL   Blood: x / Protein: Negative mg/dL / Nitrite: Negative   Leuk Esterase: Trace / RBC: 0-2 /HPF / WBC 6-10   Sq Epi: x / Non Sq Epi: Few / Bacteria: Few    EKG: sinus tachycardia.    RADIOLOGY & ADDITIONAL STUDIES:  < from: Xray Chest 1 View- PORTABLE-Routine (Xray Chest 1 View- PORTABLE-Routine .) (22 @ 21:05) >  ACC: [id number] EXAM:  XR CHEST PORTABLE ROUTINE 1V                          PROCEDURE DATE:  2022      INTERPRETATION:  Chest one view    HISTORY: Hypercalcemia    COMPARISON STUDY: 2021    Frontal expiratory view of the chest shows the heart to be normal in   size. The lungs show patchy infiltrates of the lower lungs and there is   no evidence of pneumothorax nor pleural effusion.    IMPRESSION:  Patchy infiltrates.    Thank you for the courtesy of this referral.    PAVEL BURT MD; Attending Interventional Radiologist  This document has been electronically signed. 2022  1:55PM    ASSESSMENT AND PLAN:  ·	SOB.  ·	Cough.  ·	Severe hypercalcemia  ·	Renal Insuffiencey.  ·	Dehydration.  ·	Suspect Sarcoidosis.  ·	DM.  ·	HTN    Continue IV hydration, follow calcium level.  Follow PTH and ACE level.  For Nephrology evaluation.  Will need EBUS guided biopsy of hilar lymph nodes.  Discussed with wife at bed side.    Patient is a 66y old  Male who presents with a chief complaint of sarcoidosis, hypercalcemia (2022 13:49)    HPI:  66M with DM , HTN, who presents to the ED for abnormal labs.  Patient has been following with Pulmonologist Michael Cantrell for cough x 1 year and worsening dyspnea.  Recently had a CT performed that showed hilar and mediastinal adenopathy and ground glass opacities suspicious for Sarcoidosis. Also found with severe  hypercalcemia, so was sent to ED for evaluation. Wife works in [employer].   Occasionally has flank pain on either side, along with dysuria.    Nonsmoker.      PAST MEDICAL & SURGICAL HISTORY:  Diabetes    HLD (hyperlipidemia)    FAMILY HISTORY:  FH: HTN (hypertension)    SOCIAL HISTORY: BMI (kg/m2): 24.1 (-19 @ 02:06). non smoker    Allergies  No Known Allergies    MEDICATIONS  (STANDING):  amLODIPine   Tablet 10 milliGRAM(s) Oral daily  dextrose 40% Gel 15 Gram(s) Oral once  dextrose 5%. 1000 milliLiter(s) (50 mL/Hr) IV Continuous <Continuous>  dextrose 5%. 1000 milliLiter(s) (100 mL/Hr) IV Continuous <Continuous>  dextrose 50% Injectable 25 Gram(s) IV Push once  dextrose 50% Injectable 12.5 Gram(s) IV Push once  dextrose 50% Injectable 25 Gram(s) IV Push once  furosemide   Injectable 40 milliGRAM(s) IV Push every 12 hours  glucagon  Injectable 1 milliGRAM(s) IntraMuscular once  insulin lispro (ADMELOG) corrective regimen sliding scale   SubCutaneous three times a day before meals  polyethylene glycol 3350 17 Gram(s) Oral daily  senna 2 Tablet(s) Oral at bedtime  sodium chloride 0.9%. 1000 milliLiter(s) (150 mL/Hr) IV Continuous <Continuous>    Vital Signs Last 24 Hrs  T(C): 36.7 (2022 11:30), Max: 37.2 (2022 02:06)  T(F): 98.1 (2022 11:30), Max: 98.9 (2022 02:06)  HR: 93 (2022 11:30) (93 - 102)  BP: 161/91 (2022 11:30) (160/98 - 182/95)  BP(mean): --  RR: 18 (2022 11:30) (17 - 20)  SpO2: 98% (2022 11:30) (95% - 98%)    PHYSICAL EXAM:  GEN:         Awake, responsive and comfortable.  HEENT:    Normal.    RESP:       no wheezing.  CVS:          Regular rate and rhythm.   ABD:         Soft, non-tender, non-distended;   SKIN:           Warm and dry.  EXTR:            No clubbing, cyanosis or edema  CNS:              Intact sensory and motor function.  PSYCH:        cooperative, no anxiety or depression    LABS:                        12.5   6.59  )-----------( 331      ( 2022 20:17 )             38.9         141  |  108  |  35<H>  ----------------------------<  119<H>  4.1   |  31  |  2.88<H>    Ca    14.9<HH>      2022 11:28  Mg     2.5         TPro  7.8  /  Alb  2.9<L>  /  TBili  0.3  /  DBili  x   /  AST  21  /  ALT  20  /  AlkPhos  69      Urinalysis Basic - ( 2022 01:51 )    Color: Yellow / Appearance: Clear / S.010 / pH: x  Gluc: x / Ketone: Negative  / Bili: Negative / Urobili: Negative mg/dL   Blood: x / Protein: Negative mg/dL / Nitrite: Negative   Leuk Esterase: Trace / RBC: 0-2 /HPF / WBC 6-10   Sq Epi: x / Non Sq Epi: Few / Bacteria: Few    EKG: sinus tachycardia.    RADIOLOGY & ADDITIONAL STUDIES:  < from: Xray Chest 1 View- PORTABLE-Routine (Xray Chest 1 View- PORTABLE-Routine .) (22 @ 21:05) >  ACC: [id number] EXAM:  XR CHEST PORTABLE ROUTINE 1V                          PROCEDURE DATE:  2022      INTERPRETATION:  Chest one view    HISTORY: Hypercalcemia    COMPARISON STUDY: 2021    Frontal expiratory view of the chest shows the heart to be normal in   size. The lungs show patchy infiltrates of the lower lungs and there is   no evidence of pneumothorax nor pleural effusion.    IMPRESSION:  Patchy infiltrates.    Thank you for the courtesy of this referral.    PAVEL BURT MD; Attending Interventional Radiologist  This document has been electronically signed. 2022  1:55PM    ASSESSMENT AND PLAN:  ·	SOB.  ·	Cough.  ·	Severe hypercalcemia  ·	Renal Insuffiencey.  ·	Dehydration.  ·	Suspect Sarcoidosis.  ·	DM.  ·	HTN    Continue IV hydration, follow calcium level.  Follow PTH and ACE level.  For Nephrology evaluation.  Will need EBUS guided biopsy of hilar lymph nodes.  Discussed with wife at bed side.  Discussed with DR. Villatoro, pt needs close monitoring at this point. He will ask for ICU evaluation.

## 2022-01-19 NOTE — CONSULT NOTE ADULT - SUBJECTIVE AND OBJECTIVE BOX
CHIEF COMPLAINT: Hypercalcemia    HPI: 67 y/o M w/HTN, DM, and chronic cough sent in for hypercalcemia. Patient has been undergoing workup for chronic cough and found to have bilateral opacities and hilar adenopathy on CT scan concerning for sarcoidosis. During workup patient had CMP done showing hypercalcemia so sent in to ED. Patient reports chronic cough which is unchanged, constipation, and some fatigue. No lethargy or unresponsiveness per wife. No palpitations.     REVIEW OF SYSTEMS:  See above. ROS otherwise negative.     PAST MEDICAL & SURGICAL HISTORY:  Diabetes    HLD (hyperlipidemia)        FAMILY HISTORY:  FH: HTN (hypertension)        SOCIAL HISTORY:  No tobacco use    Allergies    No Known Allergies    Intolerances        HOME MEDICATIONS:  Home Medications:          OBJECTIVE:  ICU Vital Signs Last 24 Hrs  T(C): 36.7 (2022 11:30), Max: 37.2 (2022 02:06)  T(F): 98.1 (2022 11:30), Max: 98.9 (2022 02:06)  HR: 93 (2022 11:30) (93 - 102)  BP: 161/91 (2022 11:30) (160/98 - 182/95)  BP(mean): --  ABP: --  ABP(mean): --  RR: 18 (2022 11:30) (17 - 20)  SpO2: 98% (2022 11:30) (95% - 98%)        CAPILLARY BLOOD GLUCOSE      POCT Blood Glucose.: 154 mg/dL (2022 19:38)      PHYSICAL EXAM:  General: Elderly male lying comfortably in bed, NAD  HEENT: NC/AT sclerae anciteric  Neck: Supple  Respiratory: No increased WOB, CTAB  Cardiovascular: S1, S2  Abdomen: Soft, + BS  Extremities: WWP  Skin: Intact  Neurological: Awake, alert, follows commands, moves all extremities  Psychiatry: Appropriate affect    HOSPITAL MEDICATIONS:  Standing Meds:  amLODIPine   Tablet 10 milliGRAM(s) Oral daily  dextrose 40% Gel 15 Gram(s) Oral once  dextrose 5%. 1000 milliLiter(s) IV Continuous <Continuous>  dextrose 5%. 1000 milliLiter(s) IV Continuous <Continuous>  dextrose 50% Injectable 25 Gram(s) IV Push once  dextrose 50% Injectable 12.5 Gram(s) IV Push once  dextrose 50% Injectable 25 Gram(s) IV Push once  furosemide   Injectable 40 milliGRAM(s) IV Push every 12 hours  glucagon  Injectable 1 milliGRAM(s) IntraMuscular once  insulin lispro (ADMELOG) corrective regimen sliding scale   SubCutaneous three times a day before meals  pamidronate IVPB 90 milliGRAM(s) IV Intermittent once  polyethylene glycol 3350 17 Gram(s) Oral daily  predniSONE   Tablet 40 milliGRAM(s) Oral daily  senna 2 Tablet(s) Oral at bedtime  sodium chloride 0.9%. 1000 milliLiter(s) IV Continuous <Continuous>      PRN Meds:      LABS:                        12.5   6.59  )-----------( 331      ( 2022 20:17 )             38.9     Hgb Trend: 12.5<--  -    138  |  103  |  35<H>  ----------------------------<  68<L>  3.7   |  33<H>  |  2.90<H>    Ca    14.8<HH>      2022 19:08  Mg     2.5         TPro  7.8  /  Alb  2.9<L>  /  TBili  0.3  /  DBili  x   /  AST  21  /  ALT  20  /  AlkPhos  69      Creatinine Trend: 2.90<--, 2.88<--, 3.25<--    Urinalysis Basic - ( 2022 01:51 )    Color: Yellow / Appearance: Clear / S.010 / pH: x  Gluc: x / Ketone: Negative  / Bili: Negative / Urobili: Negative mg/dL   Blood: x / Protein: Negative mg/dL / Nitrite: Negative   Leuk Esterase: Trace / RBC: 0-2 /HPF / WBC 6-10   Sq Epi: x / Non Sq Epi: Few / Bacteria: Few            MICROBIOLOGY:       RADIOLOGY:  [x ] Reviewed and interpreted by me    PULMONARY FUNCTION TESTS:    EKG:

## 2022-01-19 NOTE — H&P ADULT - ASSESSMENT
Patient is a 66M with a PMH of DM who presents to the ED for abnormal labs.  Patient has been following with Pulmonologist Michael Cantrell for history of cough x 1 year and worsening dyspnea.  Recently had a CT performed that showed hilar and mediastinal adenopathy and ground glass opacities.  Concern for Sarcoidosis, patient recently did labs and referred to the ED for hypercalcemia.  Patient states that he occasionally has flank pain on either side, along with dysuria.  Patient has no other complaints.  Nonsmoker.  Vitals stable, labs show significant hypercalcemia.  Will admit to med surg.      IMPROVE VTE Individual Risk Assessment          RISK                                                          Points  [  ] Previous VTE                                                3  [  ] Thrombophilia                                             2  [  ] Lower limb paralysis                                    2        (unable to hold up >15 seconds)    [  ] Current Cancer                                             2         (within 6 months)  [  ] Immobilization > 24 hrs                              1  [  ] ICU/CCU stay > 24 hours                            1  [  ] Age > 60                                                    1    IMPROVE VTE Score - 1

## 2022-01-19 NOTE — CONSULT NOTE ADULT - REASON FOR ADMISSION
sarcoidosis, hypercalcemia

## 2022-01-19 NOTE — H&P ADULT - NSHPLABSRESULTS_GEN_ALL_CORE
Recent Vitals  T(C): 36.9 (01-18-22 @ 17:19), Max: 36.9 (01-18-22 @ 17:19)  HR: 96 (01-18-22 @ 20:00) (96 - 109)  BP: 154/96 (01-18-22 @ 17:19) (154/96 - 154/96)  RR: 19 (01-18-22 @ 17:19) (19 - 19)  SpO2: 97% (01-18-22 @ 17:19) (97% - 97%)                        12.5   6.59  )-----------( 331      ( 18 Jan 2022 20:17 )             38.9     01-18    136  |  100  |  37<H>  ----------------------------<  76  4.7   |  31  |  3.25<H>    Ca    14.4<HH>      18 Jan 2022 20:17  Mg     2.5     01-18    TPro  8.3  /  Alb  2.8<L>  /  TBili  0.4  /  DBili  x   /  AST  24  /  ALT  22  /  AlkPhos  72  01-18      LIVER FUNCTIONS - ( 18 Jan 2022 20:17 )  Alb: 2.8 g/dL / Pro: 8.3 gm/dL / ALK PHOS: 72 U/L / ALT: 22 U/L / AST: 24 U/L / GGT: x               Home Medications:

## 2022-01-19 NOTE — PROGRESS NOTE ADULT - PROBLEM SELECTOR PLAN 2
Ca now 15.8 corrected   will give stat dose of calcitonin   will benefit from bisphosphonate but cr elevated. will discuss with nephrology   IV hydration  lasix   will check PTH, vitamin d levels   Will get KUB xray to assess for kidney stones but denies any pain   will get echo as he c/o orthopnea. o2 sats wnl appears euvolemic. will be cautious with fluids

## 2022-01-19 NOTE — H&P ADULT - HISTORY OF PRESENT ILLNESS
Patient is a 66M with a PMH of DM who presents to the ED for abnormal labs.  Patient has been following with Pulmonologist Michael Cantrell for history of cough x 1 year and worsening dyspnea.  Recently had a CT performed that showed hilar and mediastinal adenopathy and ground glass opacities.  Concern for Sarcoidosis, patient recently did labs and referred to the ED for hypercalcemia.  Patient states that he occasionally has flank pain on either side, along with dysuria.  Patient has no other complaints.  Nonsmoker.  Vitals stable, labs show significant hypercalcemia.  Will admit to med surg.

## 2022-01-19 NOTE — PATIENT PROFILE ADULT - FALL HARM RISK - UNIVERSAL INTERVENTIONS
Bed in lowest position, wheels locked, appropriate side rails in place/Call bell, personal items and telephone in reach/Instruct patient to call for assistance before getting out of bed or chair/Non-slip footwear when patient is out of bed/[place] to call system/Physically safe environment - no spills, clutter or unnecessary equipment/Purposeful Proactive Rounding/Room/bathroom lighting operational, light cord in reach

## 2022-01-20 LAB
24R-OH-CALCIDIOL SERPL-MCNC: 7.1 NG/ML — LOW (ref 30–80)
ALBUMIN SERPL ELPH-MCNC: 2.8 G/DL — LOW (ref 3.3–5)
ALP SERPL-CCNC: 52 U/L — SIGNIFICANT CHANGE UP (ref 40–120)
ALT FLD-CCNC: 20 U/L — SIGNIFICANT CHANGE UP (ref 12–78)
ANION GAP SERPL CALC-SCNC: 5 MMOL/L — SIGNIFICANT CHANGE UP (ref 5–17)
AST SERPL-CCNC: 21 U/L — SIGNIFICANT CHANGE UP (ref 15–37)
BILIRUB SERPL-MCNC: 0.4 MG/DL — SIGNIFICANT CHANGE UP (ref 0.2–1.2)
BUN SERPL-MCNC: 36 MG/DL — HIGH (ref 7–23)
CALCIUM SERPL-MCNC: 12 MG/DL — HIGH (ref 8.5–10.1)
CALCIUM SERPL-MCNC: 12.3 MG/DL — HIGH (ref 8.4–10.5)
CALCIUM SERPL-MCNC: 14.8 MG/DL — CRITICAL HIGH (ref 8.4–10.5)
CHLORIDE SERPL-SCNC: 105 MMOL/L — SIGNIFICANT CHANGE UP (ref 96–108)
CO2 SERPL-SCNC: 27 MMOL/L — SIGNIFICANT CHANGE UP (ref 22–31)
CREAT SERPL-MCNC: 2.62 MG/DL — HIGH (ref 0.5–1.3)
GLUCOSE BLDC GLUCOMTR-MCNC: 121 MG/DL — HIGH (ref 70–99)
GLUCOSE BLDC GLUCOMTR-MCNC: 153 MG/DL — HIGH (ref 70–99)
GLUCOSE BLDC GLUCOMTR-MCNC: 154 MG/DL — HIGH (ref 70–99)
GLUCOSE BLDC GLUCOMTR-MCNC: 221 MG/DL — HIGH (ref 70–99)
GLUCOSE SERPL-MCNC: 139 MG/DL — HIGH (ref 70–99)
HCT VFR BLD CALC: 36.6 % — LOW (ref 39–50)
HGB BLD-MCNC: 11.8 G/DL — LOW (ref 13–17)
MCHC RBC-ENTMCNC: 25.8 PG — LOW (ref 27–34)
MCHC RBC-ENTMCNC: 32.2 GM/DL — SIGNIFICANT CHANGE UP (ref 32–36)
MCV RBC AUTO: 80.1 FL — SIGNIFICANT CHANGE UP (ref 80–100)
NRBC # BLD: 0 /100 WBCS — SIGNIFICANT CHANGE UP (ref 0–0)
PLATELET # BLD AUTO: 315 K/UL — SIGNIFICANT CHANGE UP (ref 150–400)
POTASSIUM SERPL-MCNC: 4.1 MMOL/L — SIGNIFICANT CHANGE UP (ref 3.5–5.3)
POTASSIUM SERPL-SCNC: 4.1 MMOL/L — SIGNIFICANT CHANGE UP (ref 3.5–5.3)
PROT SERPL-MCNC: 7.7 GM/DL — SIGNIFICANT CHANGE UP (ref 6–8.3)
PTH-INTACT FLD-MCNC: 10 PG/ML — LOW (ref 15–65)
PTH-INTACT FLD-MCNC: 15 PG/ML — SIGNIFICANT CHANGE UP (ref 15–65)
RBC # BLD: 4.57 M/UL — SIGNIFICANT CHANGE UP (ref 4.2–5.8)
RBC # FLD: 13.7 % — SIGNIFICANT CHANGE UP (ref 10.3–14.5)
SODIUM SERPL-SCNC: 137 MMOL/L — SIGNIFICANT CHANGE UP (ref 135–145)
VIT D25+D1,25 OH+D1,25 PNL SERPL-MCNC: 113 PG/ML — HIGH (ref 19.9–79.3)
WBC # BLD: 5.74 K/UL — SIGNIFICANT CHANGE UP (ref 3.8–10.5)
WBC # FLD AUTO: 5.74 K/UL — SIGNIFICANT CHANGE UP (ref 3.8–10.5)

## 2022-01-20 PROCEDURE — 99233 SBSQ HOSP IP/OBS HIGH 50: CPT

## 2022-01-20 PROCEDURE — 99232 SBSQ HOSP IP/OBS MODERATE 35: CPT

## 2022-01-20 RX ADMIN — Medication 40 MILLIGRAM(S): at 02:00

## 2022-01-20 RX ADMIN — Medication 1: at 18:00

## 2022-01-20 RX ADMIN — POLYETHYLENE GLYCOL 3350 17 GRAM(S): 17 POWDER, FOR SOLUTION ORAL at 11:14

## 2022-01-20 RX ADMIN — AMLODIPINE BESYLATE 10 MILLIGRAM(S): 2.5 TABLET ORAL at 07:12

## 2022-01-20 RX ADMIN — Medication 2: at 11:14

## 2022-01-20 RX ADMIN — SODIUM CHLORIDE 150 MILLILITER(S): 9 INJECTION INTRAMUSCULAR; INTRAVENOUS; SUBCUTANEOUS at 08:09

## 2022-01-20 RX ADMIN — Medication 40 MILLIGRAM(S): at 07:12

## 2022-01-20 RX ADMIN — SENNA PLUS 2 TABLET(S): 8.6 TABLET ORAL at 21:28

## 2022-01-20 NOTE — PROGRESS NOTE ADULT - REASON FOR ADMISSION
sarcoidosis, hypercalcemia

## 2022-01-20 NOTE — PROGRESS NOTE ADULT - ASSESSMENT
Patient is a 66M with a PMH of DM who presents to the ED for abnormal labs.  Patient has been following with Pulmonologist Michael Cantrell for history of cough x 1 year and worsening dyspnea.  Recently had a CT performed that showed hilar and mediastinal adenopathy and ground glass opacities.  Concern for Sarcoidosis, patient recently did labs and referred to the ED for hypercalcemia.  Patient states that he occasionally has flank pain on either side, along with dysuria.  Patient has no other complaints.  Nonsmoker.  Vitals stable, labs show significant hypercalcemia.  Will admit to med surg.    
Patient is a 66M with a PMH of DM who presents to the ED for abnormal labs.  Patient has been following with Pulmonologist Michael Cantrell for history of cough x 1 year and worsening dyspnea.  Recently had a CT performed that showed hilar and mediastinal adenopathy and ground glass opacities.  Concern for Sarcoidosis, patient recently did labs and referred to the ED for hypercalcemia.  Patient states that he occasionally has flank pain on either side, along with dysuria.  Patient has no other complaints.  Nonsmoker.  Vitals stable, labs show significant hypercalcemia.  Will admit to med surg.      Sarcoidosis:  - Pt was being followed up outpt with his pulmonologist.  - Pt was started on steroid  - Will need EBUS, possibly can do outpt with his pulmonologist and thoracic sx.    Hypercalcemia:  - 2/2 sarcoidosis  - S/P calcitonin, lasix  - Continue IVF  - Rheum and renal following     Orthopnea:  - Follow up 2D echo    DVT ppx:  scds.    Discussed with wife.
66 year old male with progressively worsening cough/dyspnea x 1 year, found to have hilar/mediastinal lymphadenopathy on recent CT, and now admitted for hypercalcemia.  Presentation most suggestive of sarcoidosis.  Calcium has improved this morning, likely due to prednisone.    - Continue prednisone 40mg daily.    - Pulm planning EBUS / lung bx.    - f/u vit D levels.

## 2022-01-20 NOTE — PROGRESS NOTE ADULT - SUBJECTIVE AND OBJECTIVE BOX
NEPHROLOGY PROGRESS NOTE    CHIEF COMPLAINT:  MAURI/CKD, hypercalcmeia    HPI:  Patient is polyuric with decrease in calcium.    ROS:  c/o constipation    EXAM:  T(F): 98 (22 @ 10:53)  HR: 107 (22 @ 10:53)  BP: 124/69 (22 @ 10:53)  RR: 18 (22 @ 10:53)  SpO2: 97% (22 @ 10:53)    Conversant, in no apparent distress  Normal respiratory effort, lungs clear bilaterally  Heart RRR with no murmur, no peripheral edema         LABS                             11.8   5.74  )-----------( 315      ( 2022 07:34 )             36.6              137  |  105  |  36<H>  ----------------------------<  139<H>  4.1   |  27  |  2.62<H>    Ca    12.0<H>      2022 07:34  Mg     2.5         TPro  7.7  /  Alb  2.8<L>  /  TBili  0.4  /  DBili  x   /  AST  21  /  ALT  20  /  AlkPhos  52      PTH 15    25(OH)vitamin D 7.1    1,25(OH)vitamin D 113      Urinalysis Basic - ( 2022 01:51 )  Color: Yellow / Appearance: Clear / S.010 / pH: x  Gluc: x / Ketone: Negative  / Bili: Negative / Urobili: Negative mg/dL   Blood: x / Protein: Negative mg/dL / Nitrite: Negative   Leuk Esterase: Trace / RBC: 0-2 /HPF / WBC 6-10   Sq Epi: x / Non Sq Epi: Few / Bacteria: Few      Assessment   MAURI; hypercalcemia related; r/o sarcoidosis AIN - slowly better  Hypercalcemia due to excess conversion of 25 to 1,25 vitamin D / sarcoidosis - better  Degree of CKD suspected    Plan  Continue IVF  ml hr  Pamidronate 90 mg once on   Prednisone 40 mg daily                
INTERVAL HPI/OVERNIGHT EVENTS:  Feeling "better".  No new complaints.    MEDICATIONS  (STANDING):  amLODIPine   Tablet 10 milliGRAM(s) Oral daily  dextrose 40% Gel 15 Gram(s) Oral once  dextrose 5%. 1000 milliLiter(s) (50 mL/Hr) IV Continuous <Continuous>  dextrose 5%. 1000 milliLiter(s) (100 mL/Hr) IV Continuous <Continuous>  dextrose 50% Injectable 25 Gram(s) IV Push once  dextrose 50% Injectable 12.5 Gram(s) IV Push once  dextrose 50% Injectable 25 Gram(s) IV Push once  glucagon  Injectable 1 milliGRAM(s) IntraMuscular once  insulin lispro (ADMELOG) corrective regimen sliding scale   SubCutaneous three times a day before meals  polyethylene glycol 3350 17 Gram(s) Oral daily  predniSONE   Tablet 40 milliGRAM(s) Oral daily  senna 2 Tablet(s) Oral at bedtime  sodium chloride 0.9%. 1000 milliLiter(s) (150 mL/Hr) IV Continuous <Continuous>    MEDICATIONS  (PRN):      Allergies    No Known Allergies        Vital Signs Last 24 Hrs  T(C): 37.2 (2022 05:01), Max: 37.2 (2022 05:01)  T(F): 99 (2022 05:01), Max: 99 (2022 05:01)  HR: 105 (2022 05:01) (93 - 108)  BP: 134/76 (2022 05:01) (134/76 - 162/88)  BP(mean): --  RR: 17 (2022 05:01) (17 - 18)  SpO2: 97% (2022 05:01) (97% - 98%)    PHYSICAL EXAM:  General :  NAD  HEENT--  no oral ulcers  Lungs--  CTA B/L  Heart--  RRR, nlS1 &S2 normal;   Abdomen--  soft, NT, ND +BS  Skin:  no rashes  Musculoskeletal exam:  No synovitis;  normal ROM in all joints      LABS:                        11.8   5.74  )-----------( 315      ( 2022 07:34 )             36.6     01-20    137  |  105  |  36<H>  ----------------------------<  139<H>  4.1   |  27  |  2.62<H>    Ca    12.0<H>      2022 07:34  Mg     2.5     -    TPro  7.7  /  Alb  2.8<L>  /  TBili  0.4  /  DBili  x   /  AST  21  /  ALT  20  /  AlkPhos  52  -      Urinalysis Basic - ( 2022 01:51 )    Color: Yellow / Appearance: Clear / S.010 / pH: x  Gluc: x / Ketone: Negative  / Bili: Negative / Urobili: Negative mg/dL   Blood: x / Protein: Negative mg/dL / Nitrite: Negative   Leuk Esterase: Trace / RBC: 0-2 /HPF / WBC 6-10   Sq Epi: x / Non Sq Epi: Few / Bacteria: Few          RADIOLOGY & ADDITIONAL TESTS:  
  INTERVAL HPI:  66M with DM , HTN, who presents to the ED for abnormal labs.  Patient has been following with Pulmonologist Michael Cantrell for cough x 1 year and worsening dyspnea.  Recently had a CT performed that showed hilar and mediastinal adenopathy and ground glass opacities suspicious for Sarcoidosis. Also found with severe  hypercalcemia, so was sent to ED for evaluation. Wife works in [employer].   Occasionally has flank pain on either side, along with dysuria.    Nonsmoker.      OVERNIGHT EVENTS:  More awake and comfortable    Vital Signs Last 24 Hrs  T(C): 36.7 (2022 10:53), Max: 37.2 (2022 05:01)  T(F): 98 (2022 10:53), Max: 99 (2022 05:01)  HR: 107 (2022 10:53) (105 - 108)  BP: 124/69 (2022 10:53) (124/69 - 162/88)  BP(mean): --  RR: 18 (2022 10:53) (17 - 18)  SpO2: 97% (2022 10:53) (97% - 97%)    PHYSICAL EXAM:  GEN:         Awake, responsive and comfortable.  HEENT:    Normal.    RESP:       no wheezing.  CVS:          Regular rate and rhythm.   ABD:         Soft, non-tender, non-distended;     MEDICATIONS  (STANDING):  amLODIPine   Tablet 10 milliGRAM(s) Oral daily  dextrose 40% Gel 15 Gram(s) Oral once  dextrose 5%. 1000 milliLiter(s) (50 mL/Hr) IV Continuous <Continuous>  dextrose 5%. 1000 milliLiter(s) (100 mL/Hr) IV Continuous <Continuous>  dextrose 50% Injectable 25 Gram(s) IV Push once  dextrose 50% Injectable 12.5 Gram(s) IV Push once  dextrose 50% Injectable 25 Gram(s) IV Push once  glucagon  Injectable 1 milliGRAM(s) IntraMuscular once  insulin lispro (ADMELOG) corrective regimen sliding scale   SubCutaneous three times a day before meals  polyethylene glycol 3350 17 Gram(s) Oral daily  predniSONE   Tablet 40 milliGRAM(s) Oral daily  senna 2 Tablet(s) Oral at bedtime  sodium chloride 0.9%. 1000 milliLiter(s) (150 mL/Hr) IV Continuous <Continuous>    LABS:                        11.8   5.74  )-----------( 315      ( 2022 07:34 )             36.6         137  |  105  |  36<H>  ----------------------------<  139<H>  4.1   |  27  |  2.62<H>    Ca    12.0<H>      2022 07:34  Mg     2.5         TPro  7.7  /  Alb  2.8<L>  /  TBili  0.4  /  DBili  x   /  AST  21  /  ALT  20  /  AlkPhos  52      Urinalysis Basic - ( 2022 01:51 )    Color: Yellow / Appearance: Clear / S.010 / pH: x  Gluc: x / Ketone: Negative  / Bili: Negative / Urobili: Negative mg/dL   Blood: x / Protein: Negative mg/dL / Nitrite: Negative   Leuk Esterase: Trace / RBC: 0-2 /HPF / WBC 6-10   Sq Epi: x / Non Sq Epi: Few / Bacteria: Few    ASSESSMENT AND PLAN:  ·	SOB.  ·	Cough.  ·	Severe hypercalcemia  ·	Renal Insuffiencey.  ·	Dehydration.  ·	Suspect Sarcoidosis.  ·	DM.  ·	HTN    Hypercalcemia improving.  Got Prednisone, calcitonin, prednisone and Pamidronate.  Con IV hydration.  EBUS guided biopsy when stable.    
Patient is a 66y old  Male who presents with a chief complaint of sarcoidosis, hypercalcemia (2022 15:02)    INTERVAL HPI/OVERNIGHT EVENTS:  Pt was seen and examined, no acute events.    MEDICATIONS  (STANDING):  amLODIPine   Tablet 10 milliGRAM(s) Oral daily  dextrose 40% Gel 15 Gram(s) Oral once  dextrose 5%. 1000 milliLiter(s) (50 mL/Hr) IV Continuous <Continuous>  dextrose 5%. 1000 milliLiter(s) (100 mL/Hr) IV Continuous <Continuous>  dextrose 50% Injectable 25 Gram(s) IV Push once  dextrose 50% Injectable 12.5 Gram(s) IV Push once  dextrose 50% Injectable 25 Gram(s) IV Push once  glucagon  Injectable 1 milliGRAM(s) IntraMuscular once  insulin lispro (ADMELOG) corrective regimen sliding scale   SubCutaneous three times a day before meals  polyethylene glycol 3350 17 Gram(s) Oral daily  predniSONE   Tablet 40 milliGRAM(s) Oral daily  senna 2 Tablet(s) Oral at bedtime  sodium chloride 0.9%. 1000 milliLiter(s) (150 mL/Hr) IV Continuous <Continuous>    MEDICATIONS  (PRN):      Allergies  No Known Allergies      Vital Signs Last 24 Hrs  T(C): 36.4 (2022 17:55), Max: 37.2 (2022 05:01)  T(F): 97.6 (2022 17:55), Max: 99 (2022 05:01)  HR: 100 (2022 17:55) (100 - 108)  BP: 162/84 (2022 17:55) (124/69 - 162/88)  BP(mean): --  RR: 18 (2022 17:55) (17 - 18)  SpO2: 99% (2022 17:55) (97% - 99%)      PHYSICAL EXAM:  GENERAL: NAD  HEAD:  Atraumatic   EYES: PERRLA  NERVOUS SYSTEM:  Awake, alert  CHEST/LUNG: Clear  HEART: RRR  ABDOMEN: Soft, non tender  EXTREMITIES:  no edema      LABS:                        11.8   5.74  )-----------( 315      ( 2022 07:34 )             36.6     01-20    137  |  105  |  36<H>  ----------------------------<  139<H>  4.1   |  27  |  2.62<H>    Ca    12.0<H>      2022 07:34  Mg     2.5     -    TPro  7.7  /  Alb  2.8<L>  /  TBili  0.4  /  DBili  x   /  AST  21  /  ALT  20  /  AlkPhos  52  01-20      Urinalysis Basic - ( 2022 01:51 )    Color: Yellow / Appearance: Clear / S.010 / pH: x  Gluc: x / Ketone: Negative  / Bili: Negative / Urobili: Negative mg/dL   Blood: x / Protein: Negative mg/dL / Nitrite: Negative   Leuk Esterase: Trace / RBC: 0-2 /HPF / WBC 6-10   Sq Epi: x / Non Sq Epi: Few / Bacteria: Few      CAPILLARY BLOOD GLUCOSE      POCT Blood Glucose.: 154 mg/dL (2022 17:58)  POCT Blood Glucose.: 221 mg/dL (2022 11:11)  POCT Blood Glucose.: 121 mg/dL (2022 08:08)  POCT Blood Glucose.: 97 mg/dL (2022 22:07)  POCT Blood Glucose.: 154 mg/dL (2022 19:38)      RADIOLOGY & ADDITIONAL TESTS:    Imaging Personally Reviewed:  [ ] YES  [ ] NO    Consultant(s) Notes Reviewed:  [ ] YES  [ ] NO    Care Discussed with Consultants/Other Providers [ ] YES  [ ] NO
Patient is a 66y old  Male who presents with a chief complaint of sarcoidosis, hypercalcemia (2022 00:31)      INTERVAL HPI/OVERNIGHT EVENTS: none     MEDICATIONS  (STANDING):  amLODIPine   Tablet 10 milliGRAM(s) Oral daily  calcitonin Injectable 260 International Unit(s) IntraMuscular once  dextrose 40% Gel 15 Gram(s) Oral once  dextrose 5%. 1000 milliLiter(s) (50 mL/Hr) IV Continuous <Continuous>  dextrose 5%. 1000 milliLiter(s) (100 mL/Hr) IV Continuous <Continuous>  dextrose 50% Injectable 25 Gram(s) IV Push once  dextrose 50% Injectable 12.5 Gram(s) IV Push once  dextrose 50% Injectable 25 Gram(s) IV Push once  glucagon  Injectable 1 milliGRAM(s) IntraMuscular once  insulin lispro (ADMELOG) corrective regimen sliding scale   SubCutaneous three times a day before meals  sodium chloride 0.9%. 1000 milliLiter(s) (150 mL/Hr) IV Continuous <Continuous>    MEDICATIONS  (PRN):        Allergies    No Known Allergies    Intolerances        REVIEW OF SYSTEMS:  CONSTITUTIONAL: No fever, weight loss, or fatigue  EYES: No eye pain, visual disturbances, or discharge  ENMT:  No difficulty hearing, tinnitus, vertigo; No sinus or throat pain  NECK: No pain or stiffness  BREASTS: No pain, masses, or nipple discharge  RESPIRATORY: No cough, wheezing, chills or hemoptysis; No shortness of breath  CARDIOVASCULAR: No chest pain, palpitations, dizziness, or leg swelling  GASTROINTESTINAL: No abdominal or epigastric pain. No nausea, vomiting, or hematemesis; No diarrhea or constipation. No melena or hematochezia.  GENITOURINARY: No dysuria, frequency, hematuria, or incontinence  NEUROLOGICAL: No headaches, memory loss, loss of strength, numbness, or tremors  SKIN: No itching, burning, rashes, or lesions   LYMPH NODES: No enlarged glands  ENDOCRINE: No heat or cold intolerance; No hair loss  MUSCULOSKELETAL: No joint pain or swelling; No muscle, back, or extremity pain  PSYCHIATRIC: No depression, anxiety, mood swings, or difficulty sleeping  HEME/LYMPH: No easy bruising, or bleeding gums  ALLERGY AND IMMUNOLOGIC: No hives or eczema    Vital Signs Last 24 Hrs  T(C): 36.7 (2022 11:30), Max: 37.2 (2022 02:06)  T(F): 98.1 (2022 11:30), Max: 98.9 (2022 02:06)  HR: 93 (2022 11:30) (93 - 109)  BP: 161/91 (2022 11:30) (154/96 - 182/95)  BP(mean): --  RR: 18 (2022 11:30) (17 - 20)  SpO2: 98% (2022 11:30) (95% - 98%)      PHYSICAL EXAM:  GENERAL: NAD, well-groomed, well-developed  HEAD:  Atraumatic, Normocephalic  EYES: EOMI, PERRLA, conjunctiva and sclera clear  ENMT: No tonsillar erythema, exudates, or enlargement; Moist mucous membranes, Good dentition, No lesions  NECK: Supple, No JVD, Normal thyroid  NERVOUS SYSTEM:  Alert & Oriented X3, Good concentration; Motor Strength 5/5 B/L upper and lower extremities; DTRs 2+ intact and symmetric  CHEST/LUNG: Clear to percussion bilaterally; No rales, rhonchi, wheezing, or rubs  HEART: Regular rate and rhythm; No murmurs, rubs, or gallops  ABDOMEN: Soft, Nontender, Nondistended; Bowel sounds present  EXTREMITIES:  2+ Peripheral Pulses, No clubbing, cyanosis, or edema  LYMPH: No lymphadenopathy noted  SKIN: No rashes or lesions    LABS:                        12.5   6.59  )-----------( 331      ( 2022 20:17 )             38.9     01-19    141  |  108  |  35<H>  ----------------------------<  119<H>  4.1   |  31  |  2.88<H>    Ca    14.9<HH>      2022 11:28  Mg     2.5         TPro  7.8  /  Alb  2.9<L>  /  TBili  0.3  /  DBili  x   /  AST  21  /  ALT  20  /  AlkPhos  69  -      Urinalysis Basic - ( 2022 01:51 )    Color: Yellow / Appearance: Clear / S.010 / pH: x  Gluc: x / Ketone: Negative  / Bili: Negative / Urobili: Negative mg/dL   Blood: x / Protein: Negative mg/dL / Nitrite: Negative   Leuk Esterase: Trace / RBC: 0-2 /HPF / WBC 6-10   Sq Epi: x / Non Sq Epi: Few / Bacteria: Few      CAPILLARY BLOOD GLUCOSE      POCT Blood Glucose.: 95 mg/dL (2022 12:37)  POCT Blood Glucose.: 112 mg/dL (2022 09:53)      RADIOLOGY & ADDITIONAL TESTS:    Imaging Personally Reviewed:  [x ] YES  [ ] NO    Consultant(s) Notes Reviewed:  [ ] YES  [ ] NO    Care Discussed with Consultants/Other Providers [ ] YES  [ ] NO

## 2022-01-21 ENCOUNTER — TRANSCRIPTION ENCOUNTER (OUTPATIENT)
Age: 67
End: 2022-01-21

## 2022-01-21 VITALS
TEMPERATURE: 98 F | DIASTOLIC BLOOD PRESSURE: 78 MMHG | RESPIRATION RATE: 17 BRPM | HEART RATE: 100 BPM | OXYGEN SATURATION: 98 % | SYSTOLIC BLOOD PRESSURE: 144 MMHG

## 2022-01-21 LAB
ACE SERPL-CCNC: 268 U/L — HIGH (ref 14–82)
ALBUMIN SERPL ELPH-MCNC: 2.6 G/DL — LOW (ref 3.3–5)
ALP SERPL-CCNC: 49 U/L — SIGNIFICANT CHANGE UP (ref 40–120)
ALT FLD-CCNC: 17 U/L — SIGNIFICANT CHANGE UP (ref 12–78)
ANA PAT FLD IF-IMP: ABNORMAL
ANA TITR SER: ABNORMAL
ANION GAP SERPL CALC-SCNC: 6 MMOL/L — SIGNIFICANT CHANGE UP (ref 5–17)
AST SERPL-CCNC: 18 U/L — SIGNIFICANT CHANGE UP (ref 15–37)
BILIRUB SERPL-MCNC: 0.3 MG/DL — SIGNIFICANT CHANGE UP (ref 0.2–1.2)
BUN SERPL-MCNC: 49 MG/DL — HIGH (ref 7–23)
CALCIUM SERPL-MCNC: 10.8 MG/DL — HIGH (ref 8.5–10.1)
CHLORIDE SERPL-SCNC: 108 MMOL/L — SIGNIFICANT CHANGE UP (ref 96–108)
CO2 SERPL-SCNC: 24 MMOL/L — SIGNIFICANT CHANGE UP (ref 22–31)
CREAT SERPL-MCNC: 2.39 MG/DL — HIGH (ref 0.5–1.3)
GLUCOSE BLDC GLUCOMTR-MCNC: 187 MG/DL — HIGH (ref 70–99)
GLUCOSE SERPL-MCNC: 94 MG/DL — SIGNIFICANT CHANGE UP (ref 70–99)
HCT VFR BLD CALC: 34.5 % — LOW (ref 39–50)
HGB BLD-MCNC: 11.1 G/DL — LOW (ref 13–17)
MCHC RBC-ENTMCNC: 25.6 PG — LOW (ref 27–34)
MCHC RBC-ENTMCNC: 32.2 GM/DL — SIGNIFICANT CHANGE UP (ref 32–36)
MCV RBC AUTO: 79.5 FL — LOW (ref 80–100)
NRBC # BLD: 0 /100 WBCS — SIGNIFICANT CHANGE UP (ref 0–0)
PLATELET # BLD AUTO: 287 K/UL — SIGNIFICANT CHANGE UP (ref 150–400)
POTASSIUM SERPL-MCNC: 3.5 MMOL/L — SIGNIFICANT CHANGE UP (ref 3.5–5.3)
POTASSIUM SERPL-SCNC: 3.5 MMOL/L — SIGNIFICANT CHANGE UP (ref 3.5–5.3)
PROT SERPL-MCNC: 7.4 GM/DL — SIGNIFICANT CHANGE UP (ref 6–8.3)
RBC # BLD: 4.34 M/UL — SIGNIFICANT CHANGE UP (ref 4.2–5.8)
RBC # FLD: 13.8 % — SIGNIFICANT CHANGE UP (ref 10.3–14.5)
SODIUM SERPL-SCNC: 138 MMOL/L — SIGNIFICANT CHANGE UP (ref 135–145)
WBC # BLD: 9.37 K/UL — SIGNIFICANT CHANGE UP (ref 3.8–10.5)
WBC # FLD AUTO: 9.37 K/UL — SIGNIFICANT CHANGE UP (ref 3.8–10.5)

## 2022-01-21 PROCEDURE — 93306 TTE W/DOPPLER COMPLETE: CPT | Mod: 26

## 2022-01-21 PROCEDURE — 99239 HOSP IP/OBS DSCHRG MGMT >30: CPT

## 2022-01-21 RX ORDER — SENNA PLUS 8.6 MG/1
2 TABLET ORAL
Qty: 60 | Refills: 0
Start: 2022-01-21 | End: 2022-02-19

## 2022-01-21 RX ORDER — AMLODIPINE BESYLATE 2.5 MG/1
1 TABLET ORAL
Qty: 30 | Refills: 0
Start: 2022-01-21 | End: 2022-02-19

## 2022-01-21 RX ADMIN — SODIUM CHLORIDE 150 MILLILITER(S): 9 INJECTION INTRAMUSCULAR; INTRAVENOUS; SUBCUTANEOUS at 07:41

## 2022-01-21 RX ADMIN — Medication 1: at 11:41

## 2022-01-21 RX ADMIN — Medication 40 MILLIGRAM(S): at 05:31

## 2022-01-21 RX ADMIN — AMLODIPINE BESYLATE 10 MILLIGRAM(S): 2.5 TABLET ORAL at 05:31

## 2022-01-21 NOTE — DISCHARGE NOTE NURSING/CASE MANAGEMENT/SOCIAL WORK - PATIENT PORTAL LINK FT
You can access the FollowMyHealth Patient Portal offered by [facility] by registering at the following website: [email or web address]. By joining [facility]’s FollowMyHealth portal, you will also be able to view your health information using other applications (apps) compatible with our system.

## 2022-01-21 NOTE — DISCHARGE NOTE PROVIDER - PROVIDER TOKENS
PROVIDER:[TOKEN:[[id number]]],PROVIDER:[TOKEN:[[id number]]],FREE:[LAST:[Dr. Cantrell],PHONE:[(   )    -],FAX:[(   )    -]],FREE:[LAST:[pcp],PHONE:[(   )    -],FAX:[(   )    -]]

## 2022-01-21 NOTE — DISCHARGE NOTE PROVIDER - CARE PROVIDER_API CALL
Carlos Leigh  INTERNAL MEDICINE  [address] [facility], [address] [place] [id number]  Phone: [phone]  Fax: [phone]  Follow Up Time:     Golden Caraballo)  Internal Medicine; Rheumatology  [facility] [place] [address]  Phone: [phone]  Fax: [phone]  Follow Up Time:     Dr. Cantrell,   Phone: (   )    -  Fax: (   )    -  Follow Up Time:     pcp,   Phone: (   )    -  Fax: (   )    -  Follow Up Time:

## 2022-01-21 NOTE — DISCHARGE NOTE PROVIDER - HOSPITAL COURSE
Patient is a 66M with a PMH of DM who presents to the ED for abnormal labs.  Patient has been following with Pulmonologist Michael Cantrell for history of cough x 1 year and worsening dyspnea.  Recently had a CT performed that showed hilar and mediastinal adenopathy and ground glass opacities.  Concern for Sarcoidosis, patient recently did labs and referred to the ED for hypercalcemia.  Patient states that he occasionally has flank pain on either side, along with dysuria.  Patient has no other complaints.  Nonsmoker.  Vitals stable, labs show significant hypercalcemia.  Will admit to med surg.      Sarcoidosis:  - Pt was being followed up outpt with his pulmonologist.  - Pt was started on steroid  - Will need EBUS, Pt will do outpt with his pulmonologist and thoracic sx.     Hypercalcemia:  - 2/2 sarcoidosis  - S/P calcitonin, lasix and IVF  - Rheum and renal follow up    MAURI:  - Likely hypercalcemia related; r/o sarcoidosis AIN   - Unknown baseline cr, possible CKD?  - CR stable    DM:  - Hba 1c 6.1  - Can continue Glimepiride, stop metformin , Cr > 2    HTN:  - HCTZ/ losartan held for MAURI/ hypercalcemia  - started amlodipine     Orthopnea:  - 2D echo with preserved EF      DVT ppx:  scds.    Discussed with pt and his wife.

## 2022-01-21 NOTE — DISCHARGE NOTE PROVIDER - CARE PROVIDERS DIRECT ADDRESSES
[email or web address],DirectAddress_Unknown,DirectAddress_Unknown

## 2022-01-21 NOTE — DISCHARGE NOTE NURSING/CASE MANAGEMENT/SOCIAL WORK - NSDCPEFALRISK_GEN_ALL_CORE
For information on Fall & Injury Prevention, visit: [email or web address] OR  [email or web address] OR  https://www.cdc.gov/steadi/patient.html

## 2022-01-21 NOTE — DISCHARGE NOTE PROVIDER - NSDCMRMEDTOKEN_GEN_ALL_CORE_FT
amLODIPine 10 mg oral tablet: 1 tab(s) orally once a day  predniSONE 20 mg oral tablet: 2 tab(s) orally once a day  senna oral tablet: 2 tab(s) orally once a day (at bedtime), As Needed -for constipation

## 2022-01-21 NOTE — DISCHARGE NOTE PROVIDER - NSDCCPCAREPLAN_GEN_ALL_CORE_FT
PRINCIPAL DISCHARGE DIAGNOSIS  Diagnosis: Hypercalcemia  Assessment and Plan of Treatment: Sarcoidosis:  - Pt was being followed up outpt with his pulmonologist.  - Pt was started on steroid  - Will need EBUS, Pt will do outpt with his pulmonologist and thoracic sx.   Hypercalcemia:  - 2/2 sarcoidosis  - S/P calcitonin, lasix and IVF  - Rheum and renal follow up      SECONDARY DISCHARGE DIAGNOSES  Diagnosis: MAURI (acute kidney injury)  Assessment and Plan of Treatment: - Likely hypercalcemia related; r/o sarcoidosis AIN   - Unknown baseline cr, possible CKD?  - CR stable    Diagnosis: Benign essential HTN  Assessment and Plan of Treatment: - HCTZ/ losartan held for MAURI/ hypercalcemia  - started amlodipine    Diagnosis: DM (diabetes mellitus)  Assessment and Plan of Treatment: - Hba 1c 6.1  - Can continue Glimepiride, stop metformin , Cr > 2

## 2022-01-24 LAB — PTH RELATED PROT SERPL-MCNC: <2 PMOL/L — SIGNIFICANT CHANGE UP

## 2022-01-31 DIAGNOSIS — I12.9 HYPERTENSIVE CHRONIC KIDNEY DISEASE WITH STAGE 1 THROUGH STAGE 4 CHRONIC KIDNEY DISEASE, OR UNSPECIFIED CHRONIC KIDNEY DISEASE: ICD-10-CM

## 2022-01-31 DIAGNOSIS — E78.5 HYPERLIPIDEMIA, UNSPECIFIED: ICD-10-CM

## 2022-01-31 DIAGNOSIS — E86.0 DEHYDRATION: ICD-10-CM

## 2022-01-31 DIAGNOSIS — N17.9 ACUTE KIDNEY FAILURE, UNSPECIFIED: ICD-10-CM

## 2022-01-31 DIAGNOSIS — N18.9 CHRONIC KIDNEY DISEASE, UNSPECIFIED: ICD-10-CM

## 2022-01-31 DIAGNOSIS — E83.52 HYPERCALCEMIA: ICD-10-CM

## 2022-01-31 DIAGNOSIS — D86.9 SARCOIDOSIS, UNSPECIFIED: ICD-10-CM

## 2022-01-31 DIAGNOSIS — E11.22 TYPE 2 DIABETES MELLITUS WITH DIABETIC CHRONIC KIDNEY DISEASE: ICD-10-CM

## 2022-02-05 ENCOUNTER — OUTPATIENT (OUTPATIENT)
Dept: OUTPATIENT SERVICES | Facility: HOSPITAL | Age: 67
LOS: 1 days | Discharge: HOME | End: 2022-02-05

## 2022-02-05 DIAGNOSIS — E58 DIETARY CALCIUM DEFICIENCY: ICD-10-CM

## 2022-02-05 PROBLEM — E78.5 HYPERLIPIDEMIA, UNSPECIFIED: Chronic | Status: ACTIVE | Noted: 2022-01-18

## 2022-02-05 PROBLEM — E11.9 TYPE 2 DIABETES MELLITUS WITHOUT COMPLICATIONS: Chronic | Status: ACTIVE | Noted: 2022-01-18

## 2023-10-30 NOTE — PHYSICAL THERAPY INITIAL EVALUATION ADULT - GAIT PATTERN USED, PT EVAL
Quality 226: Preventive Care And Screening: Tobacco Use: Screening And Cessation Intervention: Patient screened for tobacco use and is an ex/non-smoker Quality 110: Preventive Care And Screening: Influenza Immunization: Influenza Immunization Administered during Influenza season Quality 130: Documentation Of Current Medications In The Medical Record: Current Medications Documented Quality 47: Advance Care Plan: Advance Care Planning discussed and documented; advance care plan or surrogate decision maker documented in the medical record. Detail Level: Detailed Quality 431: Preventive Care And Screening: Unhealthy Alcohol Use - Screening: Patient not identified as an unhealthy alcohol user when screened for unhealthy alcohol use using a systematic screening method Quality 111:Pneumonia Vaccination Status For Older Adults: Patient received any pneumococcal conjugate or polysaccharide vaccine on or after their 60th birthday and before the end of the measurement period 2-point gait

## 2024-07-31 ENCOUNTER — APPOINTMENT (OUTPATIENT)
Dept: UROLOGY | Facility: CLINIC | Age: 69
End: 2024-07-31

## 2025-01-14 NOTE — PATIENT PROFILE ADULT - FUNCTIONAL ASSESSMENT - BASIC MOBILITY 5.
Bed/Stretcher in lowest position, wheels locked, appropriate side rails in place/Call bell, personal items and telephone in reach/Instruct patient to call for assistance before getting out of bed/chair/stretcher/Non-slip footwear applied when patient is off stretcher/[place] to call system/Physically safe environment - no spills, clutter or unnecessary equipment/Purposeful proactive rounding/Room/bathroom lighting operational, light cord in reach 4 = No assist / stand by assistance

## 2025-06-17 NOTE — ED ADULT TRIAGE NOTE - AS HEIGHT TYPE
Quality 47: Advance Care Plan: Advance Care Planning discussed and documented in the medical record; patient did not wish or was not able to name a surrogate decision maker or provide an advance care plan. Detail Level: Zone Quality 226: Preventive Care And Screening: Tobacco Use: Screening And Cessation Intervention: Patient screened for tobacco use and is an ex/non-smoker stated